# Patient Record
Sex: MALE | Race: WHITE | NOT HISPANIC OR LATINO | URBAN - METROPOLITAN AREA
[De-identification: names, ages, dates, MRNs, and addresses within clinical notes are randomized per-mention and may not be internally consistent; named-entity substitution may affect disease eponyms.]

---

## 2017-04-05 NOTE — EMERGENCY DEPARTMENT REPORT
<NEYDA LONG - Last Filed: 04/05/17 18:43>





ED Abdominal Pain HPI





- General


Chief Complaint: Abdominal Pain


Stated Complaint: RT SIDE PAIN


Time Seen by Provider: 04/05/17 10:47


Source: patient


Mode of arrival: Ambulatory


Limitations: No Limitations





- History of Present Illness


Initial Comments: 





Patient complaining of 2 weeks intermittent right lower quadrant pain patient 

denies nausea vomiting or diarrhea associated with this pain, also denies fever 

or chills, or testicular pain.  Patient noted have a bag of food with him in 

the exam room.


MD Complaint: abdominal pain


-: week(s)


Location: RLQ


Migration to: no migration


Severity: moderate


Severity scale (0 -10): 5


Quality: aching


Consistency: intermittent, colicky


Worsens With: movement


Associated Symptoms: denies: nausea, vomiting, diarrhea, fever, chills, 

constipation, dysuria





- Related Data


 Previous Rx's











 Medication  Instructions  Recorded  Last Taken  Type


 


Ciprofloxacin [Ciprofloxacin ORAL 500 mg PO Q12H #28 ml 04/05/17 Unknown Rx





LIQ]    


 


metroNIDAZOLE [Flagyl] 500 mg PO Q12HR #28 tab 04/05/17 Unknown Rx











 Allergies











Allergy/AdvReac Type Severity Reaction Status Date / Time


 


morphine Allergy  Vomiting Verified 04/05/17 01:24


 


sulfamethoxazole Allergy  Rash Verified 04/05/17 01:24





[From Bactrim]     


 


trimethoprim [From Bactrim] Allergy  Rash Verified 04/05/17 01:24














ED Review of Systems


ROS: 


Stated complaint: RT SIDE PAIN


Other details as noted in HPI





Constitutional: denies: chills, fever, malaise


Eyes: as per HPI


Respiratory: denies: cough, orthopnea, shortness of breath, SOB with exertion, 

SOB at rest, wheezing


Cardiovascular: denies: chest pain, palpitations, dyspnea on exertion, orthopnea

, edema, syncope


Gastrointestinal: abdominal pain.  denies: nausea, vomiting, diarrhea, 

constipation


Genitourinary: denies: urgency, dysuria, frequency, hematuria, discharge, 

testicular pain, testicular mass


Musculoskeletal: denies: back pain


Skin: denies: rash, lesions


Neurological: denies: headache, paresthesias, vertigo





ED Past Medical Hx





- Past Medical History


Previous Medical History?: Yes


Hx Hypertension: Yes


Additional medical history: diverticulitis.  high cholestrol.  Vit D decfinacy





- Surgical History


Past Surgical History?: No





- Medications


Home Medications: 


 Home Medications











 Medication  Instructions  Recorded  Confirmed  Last Taken  Type


 


Ciprofloxacin [Ciprofloxacin ORAL 500 mg PO Q12H #28 ml 04/05/17  Unknown Rx





LIQ]     


 


metroNIDAZOLE [Flagyl] 500 mg PO Q12HR #28 tab 04/05/17  Unknown Rx














ED Physical Exam





- General


Limitations: No Limitations


General appearance: alert, in no apparent distress





- Head


Head exam: Present: atraumatic, normocephalic





- Eye


Eye exam: Present: normal appearance, PERRL, EOMI.  Absent: scleral icterus





- ENT


ENT exam: Present: normal exam, mucous membranes moist





- Neck


Neck exam: Present: normal inspection, full ROM.  Absent: tenderness, 

meningismus, lymphadenopathy





- Respiratory


Respiratory exam: Present: normal lung sounds bilaterally.  Absent: respiratory 

distress, wheezes





- Cardiovascular


Cardiovascular Exam: Present: regular rate





- GI/Abdominal


GI/Abdominal exam: Present: soft, tenderness (mild-to-moderate right lower 

quadrant tenderness on palpation).  Absent: distended, guarding, rebound





- Extremities Exam


Extremities exam: Present: normal inspection.  Absent: normal capillary refill





- Back Exam


Back exam: Present: normal inspection.  Absent: CVA tenderness (R), CVA 

tenderness (L)





- Neurological Exam


Neurological exam: Present: alert, oriented X3, CN II-XII intact





- Skin


Skin exam: Present: warm, dry, intact, normal color.  Absent: rash





ED Course





 Vital Signs











  04/05/17 04/05/17 04/05/17





  01:19 05:53 10:45


 


Temperature 98.4 F 98.1 F 98.5 F


 


Pulse Rate 92 H 76 79


 


Respiratory 20 16 20





Rate   


 


Blood Pressure 122/88 121/78 


 


Blood Pressure   131/82





[Right]   


 


O2 Sat by Pulse 97 97 98





Oximetry   














- Reevaluation(s)


Reevaluation #1: 





04/05/17 13:19


 still resting comfortably in his room.  Patient still has right lower quadrant 

pain on palpation.  Still normotensive normal cardiac and afebrile


04/05/17 13:20


Discussed CT and laboratory findings patient, will treat patient for 

diverticulitis which he says he has recurrent history of was just recently 

treated for.


04/05/17 18:44








ED Medical Decision Making





- Lab Data


Result diagrams: 


 04/05/17 01:38





 04/05/17 01:38








 Lab Results











  04/05/17 04/05/17 04/05/17 Range/Units





  01:38 01:38 Unknown 


 


WBC  8.2    (4.5-11.0)  K/mm3


 


RBC  5.19 H    (3.65-5.03)  M/mm3


 


Hgb  16.4 H    (11.8-15.2)  gm/dl


 


Hct  48.8 H    (35.5-45.6)  %


 


MCV  94    (84-94)  fl


 


MCH  32    (28-32)  pg


 


MCHC  34    (32-34)  %


 


RDW  13.9    (13.2-15.2)  %


 


Plt Count  182    (140-440)  K/mm3


 


Lymph % (Auto)  35.2 H    (13.4-35.0)  %


 


Mono % (Auto)  7.9 H    (0.0-7.3)  %


 


Eos % (Auto)  1.9    (0.0-4.3)  %


 


Baso % (Auto)  0.8    (0.0-1.8)  %


 


Lymph #  2.9    (1.2-5.4)  K/mm3


 


Mono #  0.6    (0.0-0.8)  K/mm3


 


Eos #  0.2    (0.0-0.4)  K/mm3


 


Baso #  0.1    (0.0-0.1)  K/mm3


 


Seg Neutrophils %  54.2    (40.0-70.0)  %


 


Seg Neutrophils #  4.4    (1.8-7.7)  K/mm3


 


Sodium   143   (137-145)  mmol/L


 


Potassium   3.6   (3.6-5.0)  mmol/L


 


Chloride   105.7   ()  mmol/L


 


Carbon Dioxide   22   (22-30)  mmol/L


 


Anion Gap   19   mmol/L


 


BUN   10   (9-20)  mg/dL


 


Creatinine   0.7 L   (0.8-1.5)  mg/dL


 


Estimated GFR   > 60   ml/min


 


BUN/Creatinine Ratio   14.28   %


 


Glucose   99   ()  mg/dL


 


Calcium   9.1   (8.4-10.2)  mg/dL


 


Total Bilirubin   0.4   (0.1-1.2)  mg/dL


 


AST   17   (5-40)  units/L


 


ALT   15   (7-56)  units/L


 


Alkaline Phosphatase   70   ()  units/L


 


Total Protein   6.7   (6.3-8.2)  g/dL


 


Albumin   4.1   (3.9-5)  g/dL


 


Albumin/Globulin Ratio   1.6   %


 


Lipase   21   (13-60)  units/L


 


Urine Color    Yellow  (Yellow)  


 


Urine Turbidity    Clear  (Clear)  


 


Urine pH    5.0  (5.0-7.0)  


 


Ur Specific Gravity    1.023  (1.003-1.030)  


 


Urine Protein    <15 mg/dl  (Negative)  mg/dL


 


Urine Glucose (UA)    Neg  (Negative)  mg/dL


 


Urine Ketones    Tr  (Negative)  mg/dL


 


Urine Blood    Sm  (Negative)  


 


Urine Nitrite    Neg  (Negative)  


 


Urine Bilirubin    Neg  (Negative)  


 


Urine Urobilinogen    < 2.0  (<2.0)  mg/dL


 


Ur Leukocyte Esterase    Sm  (Negative)  


 


Urine WBC (Auto)    1.0  (0.0-6.0)  /HPF


 


Urine RBC (Auto)    7.0  (0.0-6.0)  /HPF


 


U Epithel Cells (Auto)    < 1.0  (0-13.0)  /HPF


 


Urine Bacteria (Auto)    1+  (Negative)  /HPF


 


Urine Mucus    3+  /HPF











Critical care attestation.: 


If time is entered above; I have spent that time in minutes in the direct care 

of this critically ill patient, excluding procedure time.








ED Disposition


Disposition: DISCHARGED TO HOME OR SELFCARE


Is pt being admited?: No


Condition: Stable


Instructions:  Diverticulitis (ED)


Prescriptions: 


Ciprofloxacin [Ciprofloxacin ORAL LIQ] 500 mg PO Q12H #28 ml


metroNIDAZOLE [Flagyl] 500 mg PO Q12HR #28 tab


Referrals: 


VIDYA MARTE MD [Primary Care Provider] - 3-5 Days





<KENDRA BARRIOS - Last Filed: 04/07/17 02:04>





ED Medical Decision Making





- Lab Data


Result diagrams: 


 04/05/17 01:38





 04/05/17 01:38





- Radiology Data


Radiology results: report reviewed (ct a/p: reviewed)

## 2017-04-05 NOTE — CAT SCAN REPORT
CT abdomen and pelvis with contrast:



Transverse images are obtained from lower chest to the ischium. Coronal 

and sagittal 2-D reformatted images are included.



The visualized lung bases are unremarkable. There is a 3 cm splenic 

cyst. There is a 3.7 cm sharply defined low attenuation mass in his 

knee 18 mm mass in the medial right kidney that appears the same. in 

the upper pole of the left kidney. There is a 4.35 cm similar mass in 

the mid to lower kidney. There is an 8 mm nonobstructing calculus in 

the upper pole of the right kidney. The abdominal and retroperitoneal 

organs otherwise appear unremarkable. The abdominal aorta is 

unremarkable. There is no adenopathy noted.



The bowel is unopacified. The appendix is visualized and appears 

normal. There are numerous diverticula in the sigmoid colon. There are 

inflammatory changes in the mesentery adjacent to the low sigmoid colon 

with linear extensions to the colon and an adjacent loop of small 

bowel. No fluid collections identified. No free air and no free fluid 

noted. The prostate gland appears slightly prominent in size with 

degenerative calcifications.



Degenerative spondylosis is present through the lower thoracic and 

upper lumbar spine. Degenerative gas is identified at the L4-5 

interspace and there is narrowing at L1-2 and L5-S1 interspaces.



Impressions:



1. Sigmoid diverticulitis. Doubt perforation.



2. Bilateral renal cysts and nonobstructing right renal calculus. 

Described in 2012.



3. Splenic cyst described previously in 2012.



4. Degenerative lumbar spine changes.

## 2018-04-18 NOTE — EMERGENCY DEPARTMENT REPORT
ED Abdominal Pain HPI





- General


Chief Complaint: Abdominal Pain


Stated Complaint: ABD PAIN


Time Seen by Provider: 04/18/18 08:10


Source: patient


Mode of arrival: Ambulatory


Limitations: No Limitations





- History of Present Illness


Initial Comments: 





57-year-old male with a past medical history of hypertension, diverticulitis, 

and no previous abdominal surgeries presents complaining of right lower 

quadrant pain 1 day.  It is constant, sharp, moderate to severe in intensity.  

Worse with palpation.  No alleviating factors.  Denies nausea, vomiting, fever, 

diarrhea, melena, hematochezia.  Similar symptoms in the past with diagnosed 

diverticulitis last year but this pain episode is worse.


Severity scale (0 -10): 8





- Related Data


 Previous Rx's











 Medication  Instructions  Recorded  Last Taken  Type


 


Ciprofloxacin [Ciprofloxacin ORAL 500 mg PO Q12H #28 ml 04/05/17 Unknown Rx





LIQ]    


 


metroNIDAZOLE [Flagyl] 500 mg PO Q12HR #28 tab 04/05/17 Unknown Rx











 Allergies











Allergy/AdvReac Type Severity Reaction Status Date / Time


 


morphine Allergy  Vomiting Verified 04/05/17 01:24


 


sulfamethoxazole Allergy  Rash Verified 04/05/17 01:24





[From Bactrim]     


 


trimethoprim [From Bactrim] Allergy  Rash Verified 04/05/17 01:24














ED Review of Systems


ROS: 


Stated complaint: ABD PAIN


Other details as noted in HPI





Comment: All other systems reviewed and negative





ED Past Medical Hx





- Past Medical History


Previous Medical History?: Yes


Hx Hypertension: Yes


Additional medical history: diverticulitis.  high cholestrol.  Vit D decfinacy





- Surgical History


Past Surgical History?: No





- Social History


Smoking Status: Current Some Day Smoker


Substance Use Type: None





- Medications


Home Medications: 


 Home Medications











 Medication  Instructions  Recorded  Confirmed  Last Taken  Type


 


Ciprofloxacin [Ciprofloxacin ORAL 500 mg PO Q12H #28 ml 04/05/17  Unknown Rx





LIQ]     


 


metroNIDAZOLE [Flagyl] 500 mg PO Q12HR #28 tab 04/05/17  Unknown Rx














ED Physical Exam





- General


Limitations: No Limitations





- Other


Other exam information: 





General: No limitations, patient is alert in no acute distress


Head exam: Atraumatic, normocephalic


Eyes exam: Normal appearance, nonicteric sclerae


ENT: Moist mucous membrane, normal oropharynx


Neck exam: Normal inspection, full range of motion, no meningismus nontender


Respiratory exam: Clear to auscultation bilateral, no wheezes, rales, crackles


Cardiovascular: Normal rate and rhythm, normal heart sounds


Abdomen: Soft, nondistended, right lower quadrant tenderness, with normal bowel 

sounds, no rebound, or guarding


Extremity: Full range of motion normal inspection no deformity


Back: Normal Inspection, full range of motion, no tenderness


Neurologic: Alert, oriented x3, cranial nerves intact, no motor or sensory 

deficit


Psychiatric: normal affect, normal mood


Skin: Warm, dry, intact





ED Course


 Vital Signs











  04/18/18 04/18/18 04/18/18





  00:56 04:30 07:43


 


Temperature 99.5 F  98.9 F


 


Pulse Rate 98 H 93 H 86


 


Respiratory 18 16 18





Rate   


 


Blood Pressure 148/79 124/74 


 


Blood Pressure   113/63





[Left]   


 


O2 Sat by Pulse 98 98 96





Oximetry   














  04/18/18 04/18/18





  07:44 09:27


 


Temperature  


 


Pulse Rate  


 


Respiratory 18 18





Rate  


 


Blood Pressure  


 


Blood Pressure  





[Left]  


 


O2 Sat by Pulse 96 





Oximetry  














- Reevaluation(s)


Reevaluation #1: 





04/18/18 09:57


no improvement in pain with dilaudid 0.5mg, no nausea, additional meds ordered





- Consultations


Consultation #1: 





04/18/18 10:07


Dr Sanches to consult


04/18/18 10:18


case d/w Dr sanches


Recommend to remain nothing by mouth, continue Zosyn, he will evaluate patient








ED Medical Decision Making





- Lab Data


Result diagrams: 


 04/18/18 01:01





 04/18/18 01:04








 Lab Results











  04/18/18 04/18/18 04/18/18 Range/Units





  01:01 01:04 01:55 


 


WBC  13.7 H    (4.5-11.0)  K/mm3


 


RBC  4.85    (3.65-5.03)  M/mm3


 


Hgb  15.8 H    (11.8-15.2)  gm/dl


 


Hct  47.1 H    (35.5-45.6)  %


 


MCV  97 H    (84-94)  fl


 


MCH  33 H    (28-32)  pg


 


MCHC  34    (32-34)  %


 


RDW  14.5    (13.2-15.2)  %


 


Plt Count  171    (140-440)  K/mm3


 


Lymph % (Auto)  6.1 L    (13.4-35.0)  %


 


Mono % (Auto)  4.9    (0.0-7.3)  %


 


Eos % (Auto)  0.0    (0.0-4.3)  %


 


Baso % (Auto)  0.2    (0.0-1.8)  %


 


Lymph #  0.8 L    (1.2-5.4)  K/mm3


 


Mono #  0.7    (0.0-0.8)  K/mm3


 


Eos #  0.0    (0.0-0.4)  K/mm3


 


Baso #  0.0    (0.0-0.1)  K/mm3


 


Seg Neutrophils %  88.8 H    (40.0-70.0)  %


 


Seg Neutrophils #  12.2 H    (1.8-7.7)  K/mm3


 


Sodium   138   (137-145)  mmol/L


 


Potassium   4.7   (3.6-5.0)  mmol/L


 


Chloride   97.5 L   ()  mmol/L


 


Carbon Dioxide   24   (22-30)  mmol/L


 


Anion Gap   21   mmol/L


 


BUN   10   (9-20)  mg/dL


 


Creatinine   0.8   (0.8-1.5)  mg/dL


 


Estimated GFR   > 60   ml/min


 


BUN/Creatinine Ratio   13   %


 


Glucose   104 H   ()  mg/dL


 


Calcium   9.0   (8.4-10.2)  mg/dL


 


Total Bilirubin   0.80   (0.1-1.2)  mg/dL


 


AST   13   (5-40)  units/L


 


ALT   12   (7-56)  units/L


 


Alkaline Phosphatase   83   ()  units/L


 


Total Protein   6.6   (6.3-8.2)  g/dL


 


Albumin   4.6   (3.9-5)  g/dL


 


Albumin/Globulin Ratio   2.3   %


 


Urine Color    Yellow  (Yellow)  


 


Urine Turbidity    Clear  (Clear)  


 


Urine pH    5.0  (5.0-7.0)  


 


Ur Specific Gravity    1.014  (1.003-1.030)  


 


Urine Protein    <15 mg/dl  (Negative)  mg/dL


 


Urine Glucose (UA)    Neg  (Negative)  mg/dL


 


Urine Ketones    20  (Negative)  mg/dL


 


Urine Blood    Sm  (Negative)  


 


Urine Nitrite    Neg  (Negative)  


 


Urine Bilirubin    Neg  (Negative)  


 


Urine Urobilinogen    < 2.0  (<2.0)  mg/dL


 


Ur Leukocyte Esterase    Tr  (Negative)  


 


Urine WBC (Auto)    1.0  (0.0-6.0)  /HPF


 


Urine RBC (Auto)    2.0  (0.0-6.0)  /HPF


 


U Epithel Cells (Auto)    < 1.0  (0-13.0)  /HPF


 


Urine Mucus    Few  /HPF














- Radiology Data


Radiology results: report reviewed





ct a/p IV contrast read by radiologist:





IMPRESSION: 


Acute sigmoid diverticulitis. Trace free peritoneal air is identified 


but no evidence for abscess at this time. 


Bilateral renal cysts. 


Right renal calculus, nonobstructing. 





- Medical Decision Making





Lower quadrant pain secondary to acute sigmoid diverticulitis with trace free 

air.  Patient given Zosyn, dilaudid, zofran in the ED.  Surgery consulted. Plan 

to admit the hospital.





- Differential Diagnosis


appendicitis, diverticulitis, UTI, renal colic


Critical Care Time: No


Critical care attestation.: 


If time is entered above; I have spent that time in minutes in the direct care 

of this critically ill patient, excluding procedure time.








ED Disposition


Clinical Impression: 


 Acute diverticulitis, Free intraperitoneal air





Disposition: DC-[id number] OP ADMIT IP TO THIS HOSP


Is pt being admited?: Yes


Condition: Stable


Time of Disposition: 09:57 (hospitalist/Dr Araya/Dr Anderson)

## 2018-04-18 NOTE — CAT SCAN REPORT
CT ABDOMEN PELVIS WITH CONTRAST:



HISTORY:  Right lower quadrant pain, history of diverticulitis.



COMPARISON: 4/5/17.



TECHNIQUE:  Helical CT in 1.25mm intervals following IV contrast. 

Sagittal and coronal reconstructions. 





FINDINGS:



Lung bases: Normal.



Liver: Normal.



Biliary system: Normal.



Pancreas: Normal.



Spleen: Normal size spleen. A 2.5 cm cyst with focal wall calcification 

is noted in the anterior spleen which is unchanged.



Kidneys/ureters/bladder: There are 2 simple cysts in the superior left 

kidney measuring 4.6 cm and 3.4 cm. A 1 cm cyst is noted in the mid 

right kidney. An 8 mm calyceal stone is noted at the inferior pole of 

the right kidney. Ureters and bladder are unremarkable. The prostate 

gland is mildly enlarged measuring 5.9 cm in diameter.



Adrenal glands: Normal.



Aorta: Normal.



Intestines: There are scattered diverticula in the sigmoid colon with 

mild circumferential bowel wall thickening and surrounding fat 

stranding. This is consistent with acute sigmoid diverticulitis. There 

is no evidence for abscess, however, trace to small free air is 

identified in the right lower quadrant mesentery and along the anterior 

abdominal wall.



Appendix: Normal.



Pelvic viscera: Normal.



Ascites: None.



Adenopathy: None.



Musculoskeletal: There is moderate thoracolumbar spondylosis. No 

evidence for fracture or suspicious bony lesion.





IMPRESSION:

Acute sigmoid diverticulitis. Trace free peritoneal air is identified 

but no evidence for abscess at this time.

Bilateral renal cysts.

Right renal calculus, nonobstructing.



These findings were discussed with Dr. Page in the emergency department 

at 0920 hours.

## 2018-04-18 NOTE — HISTORY AND PHYSICAL REPORT
History of Present Illness


Date of examination: 04/18/18


Date of admission: 


04/18/18 10:08





Chief complaint: 


Abdominal pains





History of present illness: 


Patient is a 56 yo man with a history of hypertension, tobacco dependency, 

insomnia, dyslipidemia and recurrent diverticulitis who presents to the ED with 

severe right lower quadrant non radiating pains, achy, throbbing and non-

radiating that started on Monday as intermittent pains, now constant and severe 

without aggravating or relieving factors. He denies n/v/fevers/chills/cough/cp/

sob. The abd pains is similar to prior attacks; his last attack was about 1 

year ago. He usually is treated with 2 iv abx for couple of days then 

discharge. He is followed closely by GI, Dr. Sandoval. 





PMH: as hpi, also bph


PSH: tonsillectomy


SH: 1 pack cig/week, no etoh or illegal drug use


FH: sister and brother have hypertension and dm





ROS: 


Constitutional: denies: fever 


ENT: denies: throat or neck pain 


Respiratory: denies: cough, shortness of breath 


Cardiovascular: denies: chest pain 


Endocrine: denies unexplained weight loss or gain 


Gastrointestinal: + abdominal pains, no nausea 


Genitourinary: denies: dysuria 


Rectal: denies no incontinence, no bleeding, no itching, no discharge 


Musculoskeletal: denies swelling, myaglia, muscle weakness 


Skin: denies: rash 


Neurological: denies: headache 


Hematological/Lymphatic: denies: easy bleeding or easy bruising 


Allergic/Immunologic: no urticaria, no allergic rhinitis, no anaphylaxis 


Psych: denies sadness or hopelessness, SI/HI





Medications and Allergies


 Allergies











Allergy/AdvReac Type Severity Reaction Status Date / Time


 


morphine Allergy  Vomiting Verified 04/05/17 01:24


 


sulfamethoxazole Allergy  Rash Verified 04/05/17 01:24





[From Bactrim]     


 


trimethoprim [From Bactrim] Allergy  Rash Verified 04/05/17 01:24











 Home Medications











 Medication  Instructions  Recorded  Confirmed  Last Taken  Type


 


metroNIDAZOLE [Flagyl] 500 mg PO Q12HR #28 tab 04/05/17 04/18/18 04/17/18 Rx


 


Diclofenac Dr [Voltaren Dr] 75 mg PO BID 04/18/18 04/18/18 04/17/18 History


 


Dicyclomine [Bentyl] 10 mg PO QID 04/18/18 04/18/18 04/17/18 History


 


Ergocalciferol [Vitamin D2] 1 cap PO QWEEK 04/18/18 04/18/18 04/17/18 History


 


Levofloxacin [Levaquin TAB] 500 mg PO QDAY 04/18/18 04/18/18 04/17/18 History


 


Omeprazole 20 mg PO DAILY 04/18/18 04/18/18 04/17/18 History


 


Pravastatin [Pravachol] 20 mg PO QHS 04/18/18 04/18/18 04/17/18 History


 


Tamsulosin [Flomax] 0.4 mg PO QDAY 04/18/18 04/18/18 04/17/18 History


 


Zolpidem [Ambien] 10 mg PO QHS 04/18/18 04/18/18 04/17/18 History


 


amLODIPine [Norvasc] 5 mg PO BID 04/18/18 04/18/18 04/17/18 History











Active Meds: 


Active Medications





Piperacillin Sod/Tazobactam Sod (Zosyn/Ns 4.5gm/100ml)  4.5 gm in 100 mls @ 200 

mls/hr IV ONCE RYLEY


   Last Infusion: 04/18/18 11:24 Dose:  Infused











Exam





- Physical Exam


Narrative exam: 


GEN: WDWN, NAD, Awake, Alert, Orientated 


HEENT: NCAT, EOMI, PERRL, OP Clear 


NECK: supple, no adenopathy, no thyromegaly, no JVD 


CVS/HEART: RRR, normal S1S2, pulses present bilaterally 


CHEST/LUNGS: CTA B, Symmetrical chest expansion, good air entry bilaterally 


GI/Abdomen: soft, NTND, good bowel sounds, no guarding or rebound 


/Bladder: no suprapubic tenderness, no CVA or paraspinal tenderness 


EXT/Skin: no c/c/e, no obvious rash 


MSK: FROM x 4 


Neuro: CN 2-12 grossly intact, no new focal deficits 


Psych: calm 








- Constitutional


Vitals: 


 











Temp Pulse Resp BP Pulse Ox


 


 98.9 F   91 H  20   133/55   95 


 


 04/18/18 07:43  04/18/18 11:00  04/18/18 11:50  04/18/18 11:00  04/18/18 11:00














Results





- Labs


CBC & Chem 7: 


 04/18/18 01:01





 04/18/18 01:04


Labs: 


 Abnormal lab results











  04/18/18 04/18/18 Range/Units





  01:01 01:04 


 


WBC  13.7 H   (4.5-11.0)  K/mm3


 


Hgb  15.8 H   (11.8-15.2)  gm/dl


 


Hct  47.1 H   (35.5-45.6)  %


 


MCV  97 H   (84-94)  fl


 


MCH  33 H   (28-32)  pg


 


Lymph % (Auto)  6.1 L   (13.4-35.0)  %


 


Lymph #  0.8 L   (1.2-5.4)  K/mm3


 


Seg Neutrophils %  88.8 H   (40.0-70.0)  %


 


Seg Neutrophils #  12.2 H   (1.8-7.7)  K/mm3


 


Chloride   97.5 L  ()  mmol/L


 


Glucose   104 H  ()  mg/dL














Assessment and Plan


Patient is a 56 yo man with a history of hypertension, tobacco dependency, 

insomnia, dyslipidemia and recurrent diverticulitis who presents to the ED with 

severe right lower quadrant non radiating pains, achy, throbbing and non-

radiating that started on Monday as intermittent pains, now constant and severe 

without aggravating or relieving factors. He denies n/v/fevers/chills/cough/cp/

sob. The abd pains is similar to prior attacks; his last attack was about 1 

year ago. He usually is treated with 2 iv abx for couple of days then 

discharge. He is followed closely by GI, Dr. Sandoval. 





CT abd/pelvis with iv contrast IMPRESSION: Acute sigmoid diverticulitis. Trace 

free peritoneal air is identified but no evidence for abscess at this time. 

Bilateral renal cysts. Right renal calculus, nonobstructing. 





-Sepsis, poa as evident by heart rate 98, wbc 13.7 with diverticulitits: treat 

with ivf, iv abx


-Acute Diverticulitis with possible microperfortation: bowel rest, iv abx, 

General surgery already consulted by ED physician


-Hypertension: hold oral antihypertensive, use iv labetalolo prn


-Tobacco dependency:  on stopping


-Insomnia: hold oral ambien, use prn iv ativan


Dvt/gi ppx reviewed

## 2018-04-18 NOTE — CONSULTATION
REASON FOR CONSULTATION:  Abdominal pain, rule out diverticulitis with possible

microperforation.



HISTORY OF PRESENT ILLNESS:  The patient is a 57-year-old gentleman who was

admitted today with approximately a 2-day history of right lower quadrant

abdominal pain.  Denies any nausea or vomiting.  The patient states he has had a

past history of multiple bouts of diverticulitis in the past, but adamantly

refuses surgery at this time.  He does; however, state he is 

than when I was admitted.



PAST MEDICAL HISTORY:  Pertinent for hypertension, high cholesterol.



PAST SURGICAL HISTORY:  Status post T and A.



ALLERGIES:  Allergic to BACTRIM AND MORPHINE.



MEDICATIONS:  Include his blood pressure medicines 

his

high cholesterol.



FAMILY HISTORY:  Heart disease.



SOCIAL HISTORY:  Denies any ethanol intake.  Smokes what he states is a pack per

week for approximately 45 years.  He states he 

the past.



PHYSICAL EXAMINATION:

GENERAL:  At this time reveals the patient to be awake, alert, cooperative,

resting comfortably in bed with no evidence of acute distress.

VITAL SIGNS:  Show him to be afebrile with a very low grade temperature of 99,

blood pressure of 106/63, pulse of 86, respirations of 22.

ABDOMEN:  Examination of the abdomen reveals to be moderately obese.  The

abdomen is soft in the left lower quadrant, left upper quadrant and right upper

quadrant areas.  The right lower quadrant; however, does exhibit a localized

tenderness with guarding.  Bowel sounds are hypoactive.



LABORATORY DATA:  At present includes a CBC which shows a white count of 13.7, H

and H is 15.8 and 47.1.  Electrolytes are essentially within normal limits as

are the LFTs.



IMAGING DATA:  A CT scan of the abdomen has been performed, which I have

reviewed with Dr. Hernandez, the radiologist.  There is evidence of

microperforation and a very redundant sigmoid colon, which loops over to the

right lower quadrant.  There are also very small pockets of minute gas bubbles

in a couple of areas in the abdomen.  However, there is no evidence of any free

fluid or abscess formation.



IMPRESSION:

1.  At this time is that of a 57-year-old gentleman with a history of multiple

diverticulitis attacks in the past.

2.  Active and current diverticulitis with evidence of microperforation on CT. 

The patient at this time is adamantly refusing any surgery.



RECOMMENDATIONS:  To keep the patient strict n.p.o.  IV fluid hydration.  IV

Levaquin and Flagyl as you are doing.  I will repeat CBC in the morning and

monitor clinically closely with you.





DD: 04/18/2018 15:04

DT: 04/18/2018 21:44

JOB# [id number]  [id number]

CRYSTAL/NICANOR

## 2018-04-18 NOTE — PROGRESS NOTE
Assessment and Plan





Full consult dictated





Chief complaint: 


RLQ abd paiin





History of present illness: 


Patient is a 58 yo man with a history of hypertension, tobacco dependency, 

insomnia, dyslipidemia and recurrent diverticulitis who presents to the ED with 

severe right lower quadrant non radiating pains, achy, throbbing and non-

radiating that started on Monday as intermittent pains, now constant and severe 

without aggravating or relieving factors. He denies n/v/fevers/chills/cough/cp/

sob. The abd pains is similar to prior attacks; his last attack was about 1 

year ago. He usually is treated with 2 iv abx for couple of days then 

discharge. He is followed closely by GI, Dr. Sandoval. 





Pt "feeling better"   hx of "multiple attacks of diverticulitis in the past"    

refuses surg








Abd -  localized RLQ tenderness with guarding.   rest of abd soft,  non tender





CT -  microperforation of diverticulits is redundant sigmoid colon (flops to RLQ

).   No evidence of free fluid or abscess formation.








imp - recurrent diverticulits with microperforation.





pt refusing surg at this time  "feeling better"





rec 





strict NPO


IVF  hydration.





IV Levaquin & Flagyl as you are doing





f/u cbc in am





will follow and reassess clinically in am





 Selected Entries











  04/18/18





  11:57


 


Temperature 99.0 F


 


Pulse Rate 86


 


Respiratory 22





Rate 


 


Blood Pressure 106/63








 Laboratory Tests











  04/18/18 04/18/18





  01:01 01:04


 


WBC  13.7 H 


 


Hgb  15.8 H 


 


Hct  47.1 H 


 


Sodium   138


 


Potassium   4.7


 


Chloride   97.5 L


 


Carbon Dioxide   24


 


BUN   10


 


Creatinine   0.8























Objective


 Vital Signs - 12hr











  04/18/18 04/18/18 04/18/18





  04:30 07:32 07:43


 


Temperature   98.9 F


 


Pulse Rate 93 H  86


 


Respiratory 16  18





Rate   


 


Blood Pressure 124/74  


 


Blood Pressure   113/63





[Left]   


 


O2 Sat by Pulse 98 96 96





Oximetry   














  04/18/18 04/18/18 04/18/18





  07:44 07:46 08:00


 


Temperature   


 


Pulse Rate  88 85


 


Respiratory 18 18 29 H





Rate   


 


Blood Pressure  113/63 122/65


 


Blood Pressure   





[Left]   


 


O2 Sat by Pulse 96 94 





Oximetry   














  04/18/18 04/18/18 04/18/18





  08:16 08:30 09:06


 


Temperature   


 


Pulse Rate 86 85 87


 


Respiratory 14 22 18





Rate   


 


Blood Pressure 122/65 122/65 122/65


 


Blood Pressure   





[Left]   


 


O2 Sat by Pulse 98 96 94





Oximetry   














  04/18/18 04/18/18 04/18/18





  09:16 09:27 09:30


 


Temperature   


 


Pulse Rate 86  88


 


Respiratory 24 18 21





Rate   


 


Blood Pressure 122/65  122/65


 


Blood Pressure   





[Left]   


 


O2 Sat by Pulse 97  94





Oximetry   














  04/18/18 04/18/18 04/18/18





  09:46 10:00 10:16


 


Temperature   


 


Pulse Rate 88 91 H 91 H


 


Respiratory 27 H 11 L 15





Rate   


 


Blood Pressure 122/65 133/55 133/55


 


Blood Pressure   





[Left]   


 


O2 Sat by Pulse 88 96 93





Oximetry   














  04/18/18 04/18/18 04/18/18





  10:30 10:46 11:00


 


Temperature   


 


Pulse Rate 89 92 H 91 H


 


Respiratory 17 20 16





Rate   


 


Blood Pressure 133/55 133/55 133/55


 


Blood Pressure   





[Left]   


 


O2 Sat by Pulse 94 92 95





Oximetry   














  04/18/18 04/18/18 04/18/18





  11:10 11:50 11:57


 


Temperature   99.0 F


 


Pulse Rate 100 H  86


 


Respiratory 23 20 22





Rate   


 


Blood Pressure 133/55  106/63


 


Blood Pressure   





[Left]   


 


O2 Sat by Pulse 94  93





Oximetry   














- Labs





 04/18/18 01:01





 04/18/18 01:04


 Diabetes panel











  04/18/18 Range/Units





  01:04 


 


Sodium  138  (137-145)  mmol/L


 


Potassium  4.7  (3.6-5.0)  mmol/L


 


Chloride  97.5 L  ()  mmol/L


 


Carbon Dioxide  24  (22-30)  mmol/L


 


BUN  10  (9-20)  mg/dL


 


Creatinine  0.8  (0.8-1.5)  mg/dL


 


Glucose  104 H  ()  mg/dL


 


Calcium  9.0  (8.4-10.2)  mg/dL


 


AST  13  (5-40)  units/L


 


ALT  12  (7-56)  units/L


 


Alkaline Phosphatase  83  ()  units/L


 


Total Protein  6.6  (6.3-8.2)  g/dL


 


Albumin  4.6  (3.9-5)  g/dL








 Calcium panel











  04/18/18 Range/Units





  01:04 


 


Calcium  9.0  (8.4-10.2)  mg/dL


 


Albumin  4.6  (3.9-5)  g/dL








 Pituitary panel











  04/18/18 Range/Units





  01:04 


 


Sodium  138  (137-145)  mmol/L


 


Potassium  4.7  (3.6-5.0)  mmol/L


 


Chloride  97.5 L  ()  mmol/L


 


Carbon Dioxide  24  (22-30)  mmol/L


 


BUN  10  (9-20)  mg/dL


 


Creatinine  0.8  (0.8-1.5)  mg/dL


 


Glucose  104 H  ()  mg/dL


 


Calcium  9.0  (8.4-10.2)  mg/dL








 Adrenal panel











  04/18/18 Range/Units





  01:04 


 


Sodium  138  (137-145)  mmol/L


 


Potassium  4.7  (3.6-5.0)  mmol/L


 


Chloride  97.5 L  ()  mmol/L


 


Carbon Dioxide  24  (22-30)  mmol/L


 


BUN  10  (9-20)  mg/dL


 


Creatinine  0.8  (0.8-1.5)  mg/dL


 


Glucose  104 H  ()  mg/dL


 


Calcium  9.0  (8.4-10.2)  mg/dL


 


Total Bilirubin  0.80  (0.1-1.2)  mg/dL


 


AST  13  (5-40)  units/L


 


ALT  12  (7-56)  units/L


 


Alkaline Phosphatase  83  ()  units/L


 


Total Protein  6.6  (6.3-8.2)  g/dL


 


Albumin  4.6  (3.9-5)  g/dL

## 2018-04-19 NOTE — PROGRESS NOTE
Assessment and Plan





Pt afebrile.  "feeling better"





Abd soft.   still RLQ abd tenderness.   slightly improved but still present.  

wbc down to 9.9





stable





keep npo





continue present care











 Selected Entries











  04/19/18





  07:26


 


Temperature 97.8 F


 


Pulse Rate 75


 


Respiratory 18





Rate 


 


Blood Pressure 111/62








 Laboratory Tests











  04/18/18 04/19/18





  01:01 05:30


 


WBC  13.7 H  9.9


 


Hgb  15.8 H  13.8


 


Hct  47.1 H  40.4  D














Objective


 Vital Signs - 12hr











  04/18/18 04/19/18 04/19/18





  23:20 01:23 07:26


 


Temperature 99.4 F  97.8 F


 


Pulse Rate 80  75


 


Respiratory 18 18 18





Rate   


 


Blood Pressure 104/61  111/62


 


O2 Sat by Pulse 91  92





Oximetry   














- Labs





 04/19/18 05:30





 04/19/18 05:30


 Diabetes panel











  04/19/18 Range/Units





  05:30 


 


Sodium  140  (137-145)  mmol/L


 


Potassium  3.7  D  (3.6-5.0)  mmol/L


 


Chloride  99.9  ()  mmol/L


 


Carbon Dioxide  28  (22-30)  mmol/L


 


BUN  11  (9-20)  mg/dL


 


Creatinine  0.8  (0.8-1.5)  mg/dL


 


Glucose  96  ()  mg/dL


 


Calcium  8.7  (8.4-10.2)  mg/dL








 Calcium panel











  04/19/18 Range/Units





  05:30 


 


Calcium  8.7  (8.4-10.2)  mg/dL








 Pituitary panel











  04/19/18 Range/Units





  05:30 


 


Sodium  140  (137-145)  mmol/L


 


Potassium  3.7  D  (3.6-5.0)  mmol/L


 


Chloride  99.9  ()  mmol/L


 


Carbon Dioxide  28  (22-30)  mmol/L


 


BUN  11  (9-20)  mg/dL


 


Creatinine  0.8  (0.8-1.5)  mg/dL


 


Glucose  96  ()  mg/dL


 


Calcium  8.7  (8.4-10.2)  mg/dL








 Adrenal panel











  04/19/18 Range/Units





  05:30 


 


Sodium  140  (137-145)  mmol/L


 


Potassium  3.7  D  (3.6-5.0)  mmol/L


 


Chloride  99.9  ()  mmol/L


 


Carbon Dioxide  28  (22-30)  mmol/L


 


BUN  11  (9-20)  mg/dL


 


Creatinine  0.8  (0.8-1.5)  mg/dL


 


Glucose  96  ()  mg/dL


 


Calcium  8.7  (8.4-10.2)  mg/dL

## 2018-04-19 NOTE — PROGRESS NOTE
Assessment and Plan


Assessment and plan: 


Patient is a 58 yo man with a history of hypertension, tobacco dependency, 

insomnia, dyslipidemia and recurrent diverticulitis who presents to the ED with 

severe right lower quadrant non radiating pains, achy, throbbing and non-

radiating that started on Monday as intermittent pains, now constant and severe 

without aggravating or relieving factors. He denies n/v/fevers/chills/cough/cp/

sob. The abd pains is similar to prior attacks; his last attack was about 1 

year ago. He usually is treated with 2 iv abx for couple of days then 

discharge. He is followed closely by GI, Dr. Sandoval. 





CT abd/pelvis with iv contrast IMPRESSION: Acute sigmoid diverticulitis. Trace 

free peritoneal air is identified but no evidence for abscess at this time. 

Bilateral renal cysts. Right renal calculus, nonobstructing. 





-Sepsis, poa as evident by heart rate 98, wbc 13.7 with diverticulitits: treat 

with ivf, iv abx


-Acute Sigmoid Diverticulitis with microperfortation: bowel rest, iv abx, 

General surgery following, pain control with iv narcotics


-Hypertension: hold oral antihypertensive, use iv labetalol prn


-Tobacco dependency:  on stopping


-Insomnia: hold oral ambien, use prn iv ativan


Dvt/gi ppx reviewed





continue npo, ivf, iv abx


labs stable, wbc normal now, no need to re-order tomorrow





History


Interval history: 


Patient was seen and examined. Follow-up on current diagnosis. Overnight 

uneventful. Patient denies any chest pain, shortness breath, nausea/vomiting or 

severe headaches. Imaging, nursing note, chart, labs and old chart reviewed. 

Discussed with patient.  





Hospitalist Physical





- Physical exam


Narrative exam: 


GEN: WDWN, NAD, Awake, Alert, Orientated x 3


HEENT: NCAT, EOMI, PERRL, OP Clear 


NECK: supple, no adenopathy, no thyromegaly, no JVD 


CVS/HEART: RRR, normal S1S2, pulses present bilaterally 


CHEST/LUNGS: CTA B, Symmetrical chest expansion, good air entry bilaterally 


GI/Abdomen: soft, RLQ tender no subq air palp, good bowel sounds, no guarding 

or rebound 


/Bladder: no suprapubic tenderness, no CVA or paraspinal tenderness 


EXT/Skin: no c/c/e, no obvious rash 


MSK: FROM x 4 


Neuro: CN 2-12 grossly intact, no new focal deficits 


Psych: calm 








- Constitutional


Vitals: 


 











Temp Pulse Resp BP Pulse Ox


 


 97.8 F   75   18   111/62   92 


 


 04/19/18 07:26  04/19/18 07:26  04/19/18 07:26  04/19/18 07:26  04/19/18 07:26














Results





- Labs


CBC & Chem 7: 


 04/19/18 05:30





 04/19/18 05:30


Labs: 


 Laboratory Last Values











WBC  9.9 K/mm3 (4.5-11.0)   04/19/18  05:30    


 


RBC  4.18 M/mm3 (3.65-5.03)   04/19/18  05:30    


 


Hgb  13.8 gm/dl (11.8-15.2)   04/19/18  05:30    


 


Hct  40.4 % (35.5-45.6)  D 04/19/18  05:30    


 


MCV  97 fl (84-94)  H  04/19/18  05:30    


 


MCH  33 pg (28-32)  H  04/19/18  05:30    


 


MCHC  34 % (32-34)   04/19/18  05:30    


 


RDW  14.4 % (13.2-15.2)   04/19/18  05:30    


 


Plt Count  137 K/mm3 (140-440)  L  04/19/18  05:30    


 


Lymph % (Auto)  16.2 % (13.4-35.0)   04/19/18  05:30    


 


Mono % (Auto)  7.2 % (0.0-7.3)   04/19/18  05:30    


 


Eos % (Auto)  0.7 % (0.0-4.3)   04/19/18  05:30    


 


Baso % (Auto)  0.2 % (0.0-1.8)   04/19/18  05:30    


 


Lymph #  1.6 K/mm3 (1.2-5.4)   04/19/18  05:30    


 


Mono #  0.7 K/mm3 (0.0-0.8)   04/19/18  05:30    


 


Eos #  0.1 K/mm3 (0.0-0.4)   04/19/18  05:30    


 


Baso #  0.0 K/mm3 (0.0-0.1)   04/19/18  05:30    


 


Seg Neutrophils %  75.7 % (40.0-70.0)  H  04/19/18  05:30    


 


Seg Neutrophils #  7.5 K/mm3 (1.8-7.7)   04/19/18  05:30    


 


Sodium  140 mmol/L (137-145)   04/19/18  05:30    


 


Potassium  3.7 mmol/L (3.6-5.0)  D 04/19/18  05:30    


 


Chloride  99.9 mmol/L ()   04/19/18  05:30    


 


Carbon Dioxide  28 mmol/L (22-30)   04/19/18  05:30    


 


Anion Gap  16 mmol/L  04/19/18  05:30    


 


BUN  11 mg/dL (9-20)   04/19/18  05:30    


 


Creatinine  0.8 mg/dL (0.8-1.5)   04/19/18  05:30    


 


Estimated GFR  > 60 ml/min  04/19/18  05:30    


 


BUN/Creatinine Ratio  14 %  04/19/18  05:30    


 


Glucose  96 mg/dL ()   04/19/18  05:30    


 


Calcium  8.7 mg/dL (8.4-10.2)   04/19/18  05:30    


 


Total Bilirubin  0.80 mg/dL (0.1-1.2)   04/18/18  01:04    


 


AST  13 units/L (5-40)   04/18/18  01:04    


 


ALT  12 units/L (7-56)   04/18/18  01:04    


 


Alkaline Phosphatase  83 units/L ()   04/18/18  01:04    


 


Total Protein  6.6 g/dL (6.3-8.2)   04/18/18  01:04    


 


Albumin  4.6 g/dL (3.9-5)   04/18/18  01:04    


 


Albumin/Globulin Ratio  2.3 %  04/18/18  01:04    


 


Urine Color  Yellow  (Yellow)   04/18/18  01:55    


 


Urine Turbidity  Clear  (Clear)   04/18/18  01:55    


 


Urine pH  5.0  (5.0-7.0)   04/18/18  01:55    


 


Ur Specific Gravity  1.014  (1.003-1.030)   04/18/18  01:55    


 


Urine Protein  <15 mg/dl mg/dL (Negative)   04/18/18  01:55    


 


Urine Glucose (UA)  Neg mg/dL (Negative)   04/18/18  01:55    


 


Urine Ketones  20 mg/dL (Negative)   04/18/18  01:55    


 


Urine Blood  Sm  (Negative)   04/18/18  01:55    


 


Urine Nitrite  Neg  (Negative)   04/18/18  01:55    


 


Urine Bilirubin  Neg  (Negative)   04/18/18  01:55    


 


Urine Urobilinogen  < 2.0 mg/dL (<2.0)   04/18/18  01:55    


 


Ur Leukocyte Esterase  Tr  (Negative)   04/18/18  01:55    


 


Urine WBC (Auto)  1.0 /HPF (0.0-6.0)   04/18/18  01:55    


 


Urine RBC (Auto)  2.0 /HPF (0.0-6.0)   04/18/18  01:55    


 


U Epithel Cells (Auto)  < 1.0 /HPF (0-13.0)   04/18/18  01:55    


 


Urine Mucus  Few /HPF  04/18/18  01:55

## 2018-04-20 NOTE — PROGRESS NOTE
Assessment and Plan


Assessment and plan: 





Patient is a 56 yo man with a history of hypertension, tobacco dependency, 

insomnia, dyslipidemia and recurrent diverticulitis who presents to the ED with 

severe right lower quadrant non radiating pains, achy, throbbing and non-

radiating that started on Monday as intermittent pains, now constant and severe 

without aggravating or relieving factors. He denies n/v/fevers/chills/cough/cp/

sob. The abd pains is similar to prior attacks; his last attack was about 1 

year ago. He usually is treated with 2 iv abx for couple of days then 

discharge. He is followed closely by GI, Dr. Sandoval. 





CT abd/pelvis with iv contrast IMPRESSION: Acute sigmoid diverticulitis. Trace 

free peritoneal air is identified but no evidence for abscess at this time. 

Bilateral renal cysts. Right renal calculus, nonobstructing. 





-Sepsis, poa as evident by heart rate 98, wbc 13.7 with diverticulitits: treat 

with ivf, iv abx


-Acute Sigmoid Diverticulitis with microperfortation: bowel rest, iv abx, 

General surgery following, pain control with iv narcotics


-Hypertension: hold oral antihypertensive, use iv labetalol prn


-Tobacco dependency: counseled on stopping


-Insomnia: hold oral ambien, use prn iv ativan


Dvt/gi ppx reviewed





continue npo, ivf, iv abx








may have ice chips and PO meds, nothing else per oral





History


Interval history: 





still c/o lower abdominal pain, up to 8/10


no fever, no nausea, no cp, no sob, 





Hospitalist Physical





- Constitutional


Vitals: 


 











Temp Pulse Resp BP Pulse Ox


 


 98.6 F   67   20   111/64   94 


 


 04/20/18 14:41  04/20/18 14:41  04/20/18 14:41  04/20/18 14:41  04/20/18 14:41











General appearance: Present: no acute distress





- EENT


Eyes: Present: PERRL


ENT: hearing intact





- Neck


Neck: Present: supple





- Respiratory


Respiratory effort: normal


Respiratory: bilateral: CTA





- Cardiovascular


Rhythm: regular


Heart Sounds: Present: S1 & S2





- Extremities


Extremities: no ischemia


Peripheral Pulses: within normal limits





- Abdominal


General gastrointestinal: soft, tender (lower abdomen)





- Integumentary


Integumentary: Present: clear, warm, dry





- Psychiatric


Psychiatric: appropriate mood/affect, intact judgment & insight





- Neurologic


Neurologic: CNII-XII intact, moves all extremities





Results





- Labs


CBC & Chem 7: 


 04/21/18 07:50





 04/21/18 07:50


Labs: 


 Laboratory Last Values











WBC  9.9 K/mm3 (4.5-11.0)   04/19/18  05:30    


 


RBC  4.18 M/mm3 (3.65-5.03)   04/19/18  05:30    


 


Hgb  13.8 gm/dl (11.8-15.2)   04/19/18  05:30    


 


Hct  40.4 % (35.5-45.6)  D 04/19/18  05:30    


 


MCV  97 fl (84-94)  H  04/19/18  05:30    


 


MCH  33 pg (28-32)  H  04/19/18  05:30    


 


MCHC  34 % (32-34)   04/19/18  05:30    


 


RDW  14.4 % (13.2-15.2)   04/19/18  05:30    


 


Plt Count  137 K/mm3 (140-440)  L  04/19/18  05:30    


 


Lymph % (Auto)  16.2 % (13.4-35.0)   04/19/18  05:30    


 


Mono % (Auto)  7.2 % (0.0-7.3)   04/19/18  05:30    


 


Eos % (Auto)  0.7 % (0.0-4.3)   04/19/18  05:30    


 


Baso % (Auto)  0.2 % (0.0-1.8)   04/19/18  05:30    


 


Lymph #  1.6 K/mm3 (1.2-5.4)   04/19/18  05:30    


 


Mono #  0.7 K/mm3 (0.0-0.8)   04/19/18  05:30    


 


Eos #  0.1 K/mm3 (0.0-0.4)   04/19/18  05:30    


 


Baso #  0.0 K/mm3 (0.0-0.1)   04/19/18  05:30    


 


Seg Neutrophils %  75.7 % (40.0-70.0)  H  04/19/18  05:30    


 


Seg Neutrophils #  7.5 K/mm3 (1.8-7.7)   04/19/18  05:30    


 


Sodium  140 mmol/L (137-145)   04/19/18  05:30    


 


Potassium  3.7 mmol/L (3.6-5.0)  D 04/19/18  05:30    


 


Chloride  99.9 mmol/L ()   04/19/18  05:30    


 


Carbon Dioxide  28 mmol/L (22-30)   04/19/18  05:30    


 


Anion Gap  16 mmol/L  04/19/18  05:30    


 


BUN  11 mg/dL (9-20)   04/19/18  05:30    


 


Creatinine  0.8 mg/dL (0.8-1.5)   04/19/18  05:30    


 


Estimated GFR  > 60 ml/min  04/19/18  05:30    


 


BUN/Creatinine Ratio  14 %  04/19/18  05:30    


 


Glucose  96 mg/dL ()   04/19/18  05:30    


 


Calcium  8.7 mg/dL (8.4-10.2)   04/19/18  05:30    


 


Total Bilirubin  0.80 mg/dL (0.1-1.2)   04/18/18  01:04    


 


AST  13 units/L (5-40)   04/18/18  01:04    


 


ALT  12 units/L (7-56)   04/18/18  01:04    


 


Alkaline Phosphatase  83 units/L ()   04/18/18  01:04    


 


Total Protein  6.6 g/dL (6.3-8.2)   04/18/18  01:04    


 


Albumin  4.6 g/dL (3.9-5)   04/18/18  01:04    


 


Albumin/Globulin Ratio  2.3 %  04/18/18  01:04    


 


Urine Color  Yellow  (Yellow)   04/18/18  01:55    


 


Urine Turbidity  Clear  (Clear)   04/18/18  01:55    


 


Urine pH  5.0  (5.0-7.0)   04/18/18  01:55    


 


Ur Specific Gravity  1.014  (1.003-1.030)   04/18/18  01:55    


 


Urine Protein  <15 mg/dl mg/dL (Negative)   04/18/18  01:55    


 


Urine Glucose (UA)  Neg mg/dL (Negative)   04/18/18  01:55    


 


Urine Ketones  20 mg/dL (Negative)   04/18/18  01:55    


 


Urine Blood  Sm  (Negative)   04/18/18  01:55    


 


Urine Nitrite  Neg  (Negative)   04/18/18  01:55    


 


Urine Bilirubin  Neg  (Negative)   04/18/18  01:55    


 


Urine Urobilinogen  < 2.0 mg/dL (<2.0)   04/18/18  01:55    


 


Ur Leukocyte Esterase  Tr  (Negative)   04/18/18  01:55    


 


Urine WBC (Auto)  1.0 /HPF (0.0-6.0)   04/18/18  01:55    


 


Urine RBC (Auto)  2.0 /HPF (0.0-6.0)   04/18/18  01:55    


 


U Epithel Cells (Auto)  < 1.0 /HPF (0-13.0)   04/18/18  01:55    


 


Urine Mucus  Few /HPF  04/18/18  01:55

## 2018-04-20 NOTE — QUERY- PERITONITIS
Deakemal Evans_______________  Date:__4/20/2018____________ 



/CDS:__Delmy____________  Phone#:__[phone]_________



Exercise your independent professional judgment when responding to query. 

Questions asked do not imply a particular answer is desired or expected. We 
greatly appreciate your clarification on this issue.           

Clinical Documentation States:



57 Year old male was admitted on 4/18/2018 with severe right lower quadrant non 
radiating pains, achy, throbbing and non-radiating.



The Hospitalist (Dr. Anderson) progress note on 4/19/2018 states



"CT abd/pelvis with iv contrast IMPRESSION: Acute sigmoid diverticulitis. Trace 
free peritoneal air is identified but no evidence for abscess at this time. 
Bilateral renal cysts. Right renal calculus, nonobstructing. 



-Sepsis, poa as evident by heart rate 98, wbc 13.7 with diverticulitits: treat 
with ivf, iv abx

-Acute Sigmoid Diverticulitis with microperfortation: bowel rest, iv abx, 
General surgery following, pain control with iv narcotics

   



Please clarify:



[ x] Localized peritonitis                 [ ] Peritoneal inflammation



[ ] Generalized peritonitis              [ ] Peritoneal infection



[ ] Retroperitoneal infection            [ ] Peritoneal irritation



[ ] Retroperitoneal inflammation      [ ] Omentitis 



[ ] Other:_______________     



[ ] Comment/Explanation:___________ 





Present on Admission:   [x ] Yes (Y)    [ ] Clinically undeterminable (W)    [ 
] No (N)   



Please also document response in your Progress Notes and/or Discharge Summary 
and indicate if the 

condition was present on admission.
BRAYDEN

## 2018-04-20 NOTE — PROGRESS NOTE
Assessment and Plan





Pt status quo.  feeling "about the same"





Abd exam - status quo








acute recurrent diverticulitis with micro perforation





stable





may have ice chips and po meds otherwise NPO





may consider TPN if pain persists beyond the next 48 hrs.





f/u CT next wk


 Selected Entries











  04/19/18 04/19/18 04/19/18





  19:05 22:00 22:53


 


Temperature 97.2 F L  


 


Pulse Rate [  72 





Left Radial]   


 


Respiratory   20





Rate   


 


Blood Pressure 105/63  














Objective


 Vital Signs - 12hr











  04/19/18 04/19/18





  22:00 22:53


 


Pulse Rate [ 72 





Left Radial]  


 


Respiratory 20 20





Rate  














- Labs





 04/19/18 05:30





 04/19/18 05:30

## 2018-04-21 NOTE — PROGRESS NOTE
Assessment and Plan


Assessment and plan: 





Patient is a 56 yo man with a history of hypertension, tobacco dependency, 

insomnia, dyslipidemia and recurrent diverticulitis who presents to the ED with 

severe right lower quadrant non radiating pains, achy, throbbing and non-

radiating that started on Monday as intermittent pains, now constant and severe 

without aggravating or relieving factors. He denies n/v/fevers/chills/cough/cp/

sob. The abd pains is similar to prior attacks; his last attack was about 1 

year ago. He usually is treated with 2 iv abx for couple of days then 

discharge. He is followed closely by GI, Dr. Sandoval. 





CT abd/pelvis with iv contrast IMPRESSION: Acute sigmoid diverticulitis. Trace 

free peritoneal air is identified but no evidence for abscess at this time. 

Bilateral renal cysts. Right renal calculus, nonobstructing. 





-Sepsis, poa as evident by heart rate 98, wbc 13.7 with diverticulitits: treat 

with ivf, iv abx


-Acute Sigmoid Diverticulitis with microperfortation: bowel rest, iv abx, 

General surgery following, pain control with iv narcotics


-Hypertension: hold oral antihypertensive, use iv labetalol prn


-Tobacco dependency: counseled on stopping


-Insomnia: hold oral ambien, use prn iv ativan


Dvt/gi ppx reviewed





continue npo, ivf, iv abx








may have ice chips and PO meds, nothing else per oral





History


Interval history: 





still c/o lower abdominal pain, up to 6/10, improved


no fever, no nausea, no cp, no sob, 





Hospitalist Physical





- Physical exam


Narrative exam: 





General appearance: Present: no acute distress





- EENT


Eyes: Present: PERRL


ENT: hearing intact





- Neck


Neck: Present: supple





- Respiratory


Respiratory effort: normal


Respiratory: bilateral: CTA





- Cardiovascular


Rhythm: regular


Heart Sounds: Present: S1 & S2





- Extremities


Extremities: no ischemia


Peripheral Pulses: within normal limits





- Abdominal


General gastrointestinal: soft, tender (lower abdomen)





- Integumentary


Integumentary: Present: clear, warm, dry





- Psychiatric


Psychiatric: appropriate mood/affect, intact judgment & insight





- Neurologic


Neurologic: CNII-XII intact, moves all extremities





- Constitutional


Vitals: 


 











Temp Pulse Resp BP Pulse Ox


 


 98.6 F   67   22   111/56   93 


 


 04/21/18 07:32  04/21/18 07:32  04/21/18 12:06  04/21/18 07:32  04/21/18 07:32














Results





- Labs


CBC & Chem 7: 


 04/21/18 07:50





 04/21/18 07:50


Labs: 


 Laboratory Last Values











WBC  4.9 K/mm3 (4.5-11.0)   04/21/18  07:50    


 


RBC  4.31 M/mm3 (3.65-5.03)   04/21/18  07:50    


 


Hgb  14.1 gm/dl (11.8-15.2)   04/21/18  07:50    


 


Hct  41.2 % (35.5-45.6)   04/21/18  07:50    


 


MCV  96 fl (84-94)  H  04/21/18  07:50    


 


MCH  33 pg (28-32)  H  04/21/18  07:50    


 


MCHC  34 % (32-34)   04/21/18  07:50    


 


RDW  14.3 % (13.2-15.2)   04/21/18  07:50    


 


Plt Count  159 K/mm3 (140-440)   04/21/18  07:50    


 


Lymph % (Auto)  16.2 % (13.4-35.0)   04/19/18  05:30    


 


Mono % (Auto)  7.2 % (0.0-7.3)   04/19/18  05:30    


 


Eos % (Auto)  0.7 % (0.0-4.3)   04/19/18  05:30    


 


Baso % (Auto)  0.2 % (0.0-1.8)   04/19/18  05:30    


 


Lymph #  1.6 K/mm3 (1.2-5.4)   04/19/18  05:30    


 


Mono #  0.7 K/mm3 (0.0-0.8)   04/19/18  05:30    


 


Eos #  0.1 K/mm3 (0.0-0.4)   04/19/18  05:30    


 


Baso #  0.0 K/mm3 (0.0-0.1)   04/19/18  05:30    


 


Seg Neutrophils %  75.7 % (40.0-70.0)  H  04/19/18  05:30    


 


Seg Neutrophils #  7.5 K/mm3 (1.8-7.7)   04/19/18  05:30    


 


Sodium  136 mmol/L (137-145)  L  04/21/18  07:50    


 


Potassium  3.3 mmol/L (3.6-5.0)  L  04/21/18  07:50    


 


Chloride  102.7 mmol/L ()   04/21/18  07:50    


 


Carbon Dioxide  25 mmol/L (22-30)   04/21/18  07:50    


 


Anion Gap  12 mmol/L  04/21/18  07:50    


 


BUN  6 mg/dL (9-20)  L  04/21/18  07:50    


 


Creatinine  0.6 mg/dL (0.8-1.5)  L  04/21/18  07:50    


 


Estimated GFR  > 60 ml/min  04/21/18  07:50    


 


BUN/Creatinine Ratio  10 %  04/21/18  07:50    


 


Glucose  116 mg/dL ()  H  04/21/18  07:50    


 


Calcium  8.5 mg/dL (8.4-10.2)   04/21/18  07:50    


 


Total Bilirubin  0.80 mg/dL (0.1-1.2)   04/18/18  01:04    


 


AST  13 units/L (5-40)   04/18/18  01:04    


 


ALT  12 units/L (7-56)   04/18/18  01:04    


 


Alkaline Phosphatase  83 units/L ()   04/18/18  01:04    


 


Total Protein  6.6 g/dL (6.3-8.2)   04/18/18  01:04    


 


Albumin  4.6 g/dL (3.9-5)   04/18/18  01:04    


 


Albumin/Globulin Ratio  2.3 %  04/18/18  01:04    


 


Urine Color  Yellow  (Yellow)   04/18/18  01:55    


 


Urine Turbidity  Clear  (Clear)   04/18/18  01:55    


 


Urine pH  5.0  (5.0-7.0)   04/18/18  01:55    


 


Ur Specific Gravity  1.014  (1.003-1.030)   04/18/18  01:55    


 


Urine Protein  <15 mg/dl mg/dL (Negative)   04/18/18  01:55    


 


Urine Glucose (UA)  Neg mg/dL (Negative)   04/18/18  01:55    


 


Urine Ketones  20 mg/dL (Negative)   04/18/18  01:55    


 


Urine Blood  Sm  (Negative)   04/18/18  01:55    


 


Urine Nitrite  Neg  (Negative)   04/18/18  01:55    


 


Urine Bilirubin  Neg  (Negative)   04/18/18  01:55    


 


Urine Urobilinogen  < 2.0 mg/dL (<2.0)   04/18/18  01:55    


 


Ur Leukocyte Esterase  Tr  (Negative)   04/18/18  01:55    


 


Urine WBC (Auto)  1.0 /HPF (0.0-6.0)   04/18/18  01:55    


 


Urine RBC (Auto)  2.0 /HPF (0.0-6.0)   04/18/18  01:55    


 


U Epithel Cells (Auto)  < 1.0 /HPF (0-13.0)   04/18/18  01:55    


 


Urine Mucus  Few /HPF  04/18/18  01:55

## 2018-04-22 NOTE — PROGRESS NOTE
Assessment and Plan


Assessment and plan: 





Patient is a 58 yo man with a history of hypertension, tobacco dependency, 

insomnia, dyslipidemia and recurrent diverticulitis who presents to the ED with 

severe right lower quadrant non radiating pains, achy, throbbing and non-

radiating that started on Monday as intermittent pains, now constant and severe 

without aggravating or relieving factors. He denies n/v/fevers/chills/cough/cp/

sob. The abd pains is similar to prior attacks; his last attack was about 1 

year ago. He usually is treated with 2 iv abx for couple of days then 

discharge. He is followed closely by GI, Dr. Sandoval. 





CT abd/pelvis with iv contrast IMPRESSION: Acute sigmoid diverticulitis. Trace 

free peritoneal air is identified but no evidence for abscess at this time. 

Bilateral renal cysts. Right renal calculus, nonobstructing. 





-Sepsis, poa as evident by heart rate 98, wbc 13.7 with diverticulitits: treat 

with ivf, iv abx


-Acute Sigmoid Diverticulitis with microperfortation: bowel rest, iv abx, 

General surgery following, pain control with iv narcotics


-Hypertension: hold oral antihypertensive, use iv labetalol prn


-Tobacco dependency: counseled on stopping


-Insomnia: hold oral ambien, use prn iv ativan


-hypokalemia; repleted


Dvt/gi ppx reviewed








will try on a popsicle and awaiting gen surgery to see the patient this weekend


-if pain worsens, will make NPO again with ice chips only





History


Interval history: 





still c/o lower abdominal pain, up to 4/10, improved; localized to RLQ


no fever, no nausea, no cp, no sob, 





Hospitalist Physical





- Physical exam


Narrative exam: 





General appearance: Present: no acute distress





- EENT


Eyes: Present: PERRL


ENT: hearing intact





- Neck


Neck: Present: supple





- Respiratory


Respiratory effort: normal


Respiratory: bilateral: CTA





- Cardiovascular


Rhythm: regular


Heart Sounds: Present: S1 & S2





- Extremities


Extremities: no ischemia


Peripheral Pulses: within normal limits





- Abdominal


General gastrointestinal: soft, tender (lower abdomen)





- Integumentary


Integumentary: Present: clear, warm, dry





- Psychiatric


Psychiatric: appropriate mood/affect, intact judgment & insight





- Neurologic


Neurologic: CNII-XII intact, moves all extremities





- Constitutional


Vitals: 


 











Temp Pulse Resp BP Pulse Ox


 


 98.0 F   59 L  16   125/77   97 


 


 04/22/18 14:56  04/22/18 14:56  04/22/18 14:56  04/22/18 14:56  04/22/18 14:56














Results





- Labs


CBC & Chem 7: 


 04/21/18 07:50





 04/21/18 07:50


Labs: 


 Laboratory Last Values











WBC  4.9 K/mm3 (4.5-11.0)   04/21/18  07:50    


 


RBC  4.31 M/mm3 (3.65-5.03)   04/21/18  07:50    


 


Hgb  14.1 gm/dl (11.8-15.2)   04/21/18  07:50    


 


Hct  41.2 % (35.5-45.6)   04/21/18  07:50    


 


MCV  96 fl (84-94)  H  04/21/18  07:50    


 


MCH  33 pg (28-32)  H  04/21/18  07:50    


 


MCHC  34 % (32-34)   04/21/18  07:50    


 


RDW  14.3 % (13.2-15.2)   04/21/18  07:50    


 


Plt Count  159 K/mm3 (140-440)   04/21/18  07:50    


 


Lymph % (Auto)  16.2 % (13.4-35.0)   04/19/18  05:30    


 


Mono % (Auto)  7.2 % (0.0-7.3)   04/19/18  05:30    


 


Eos % (Auto)  0.7 % (0.0-4.3)   04/19/18  05:30    


 


Baso % (Auto)  0.2 % (0.0-1.8)   04/19/18  05:30    


 


Lymph #  1.6 K/mm3 (1.2-5.4)   04/19/18  05:30    


 


Mono #  0.7 K/mm3 (0.0-0.8)   04/19/18  05:30    


 


Eos #  0.1 K/mm3 (0.0-0.4)   04/19/18  05:30    


 


Baso #  0.0 K/mm3 (0.0-0.1)   04/19/18  05:30    


 


Seg Neutrophils %  75.7 % (40.0-70.0)  H  04/19/18  05:30    


 


Seg Neutrophils #  7.5 K/mm3 (1.8-7.7)   04/19/18  05:30    


 


Sodium  136 mmol/L (137-145)  L  04/21/18  07:50    


 


Potassium  3.3 mmol/L (3.6-5.0)  L  04/21/18  07:50    


 


Chloride  102.7 mmol/L ()   04/21/18  07:50    


 


Carbon Dioxide  25 mmol/L (22-30)   04/21/18  07:50    


 


Anion Gap  12 mmol/L  04/21/18  07:50    


 


BUN  6 mg/dL (9-20)  L  04/21/18  07:50    


 


Creatinine  0.6 mg/dL (0.8-1.5)  L  04/21/18  07:50    


 


Estimated GFR  > 60 ml/min  04/21/18  07:50    


 


BUN/Creatinine Ratio  10 %  04/21/18  07:50    


 


Glucose  116 mg/dL ()  H  04/21/18  07:50    


 


Calcium  8.5 mg/dL (8.4-10.2)   04/21/18  07:50    


 


Total Bilirubin  0.80 mg/dL (0.1-1.2)   04/18/18  01:04    


 


AST  13 units/L (5-40)   04/18/18  01:04    


 


ALT  12 units/L (7-56)   04/18/18  01:04    


 


Alkaline Phosphatase  83 units/L ()   04/18/18  01:04    


 


Total Protein  6.6 g/dL (6.3-8.2)   04/18/18  01:04    


 


Albumin  4.6 g/dL (3.9-5)   04/18/18  01:04    


 


Albumin/Globulin Ratio  2.3 %  04/18/18  01:04    


 


Urine Color  Yellow  (Yellow)   04/18/18  01:55    


 


Urine Turbidity  Clear  (Clear)   04/18/18  01:55    


 


Urine pH  5.0  (5.0-7.0)   04/18/18  01:55    


 


Ur Specific Gravity  1.014  (1.003-1.030)   04/18/18  01:55    


 


Urine Protein  <15 mg/dl mg/dL (Negative)   04/18/18  01:55    


 


Urine Glucose (UA)  Neg mg/dL (Negative)   04/18/18  01:55    


 


Urine Ketones  20 mg/dL (Negative)   04/18/18  01:55    


 


Urine Blood  Sm  (Negative)   04/18/18  01:55    


 


Urine Nitrite  Neg  (Negative)   04/18/18  01:55    


 


Urine Bilirubin  Neg  (Negative)   04/18/18  01:55    


 


Urine Urobilinogen  < 2.0 mg/dL (<2.0)   04/18/18  01:55    


 


Ur Leukocyte Esterase  Tr  (Negative)   04/18/18  01:55    


 


Urine WBC (Auto)  1.0 /HPF (0.0-6.0)   04/18/18  01:55    


 


Urine RBC (Auto)  2.0 /HPF (0.0-6.0)   04/18/18  01:55    


 


U Epithel Cells (Auto)  < 1.0 /HPF (0-13.0)   04/18/18  01:55    


 


Urine Mucus  Few /HPF  04/18/18  01:55

## 2018-04-22 NOTE — PROGRESS NOTE
Assessment and Plan





Pt feeling better.  less pain. requiring less narcotics.





Abd soft.  pain now completely localized to RLQ  but less.   no further muscle 

guarding.  +BS





clinically improving.





continue NPO





f/u CT abd on tues





 Selected Entries











  04/22/18





  14:56


 


Temperature 98.0 F


 


Pulse Rate 59 L


 


Respiratory 16





Rate 


 


Blood Pressure 125/77








 Laboratory Tests











  04/21/18





  07:50


 


WBC  4.9


 


Hgb  14.1


 


Hct  41.2














Objective


 Vital Signs - 12hr











  04/22/18





  14:56


 


Temperature 98.0 F


 


Pulse Rate 59 L


 


Respiratory 16





Rate 


 


Blood Pressure 125/77


 


O2 Sat by Pulse 97





Oximetry 














- Labs





 04/21/18 07:50





 04/21/18 07:50

## 2018-04-23 NOTE — PROGRESS NOTE
Assessment and Plan





Assessment and Plan


Assessment and plan: 





Patient is a 56 yo man with a history of hypertension, tobacco dependency, 

insomnia, dyslipidemia and recurrent diverticulitis who presents to the ED with 

severe right lower quadrant non radiating pains, achy, throbbing and non-

radiating that started on Monday as intermittent pains, now constant and severe 

without aggravating or relieving factors. He denies n/v/fevers/chills/cough/cp/

sob. The abd pains is similar to prior attacks; his last attack was about 1 

year ago. He usually is treated with 2 iv abx for couple of days then 

discharge. He is followed closely by GI, Dr. Sandoval. 





CT abd/pelvis with iv contrast IMPRESSION: Acute sigmoid diverticulitis. Trace 

free peritoneal air is identified but no evidence for abscess at this time. 

Bilateral renal cysts. Right renal calculus, nonobstructing. 





-Sepsis, poa as evident by heart rate 98, wbc 13.7 with diverticulitits: treat 

with ivf, iv abx


-Acute Sigmoid Diverticulitis with microperfortation: bowel rest, iv abx, 

General surgery following, pain control with iv narcotics


Rpt CT scan


-Hypertension: hold oral antihypertensive, use iv labetalol prn


-Tobacco dependency: counseled on stopping


-Insomnia: hold oral ambien, use prn iv ativan


-hypokalemia; repleted


Dvt/gi ppx reviewed








Subjective


Date of service: 04/23/18


Principal diagnosis: Sigmoid diverticulitis with microperforation


Interval history: 


Doing well








Objective





- Constitutional


Vitals: 


 Vital Signs - 12hr











  04/23/18





  07:35


 


Temperature 97.7 F


 


Pulse Rate 65


 


Respiratory 18





Rate 


 


Blood Pressure 112/74


 


O2 Sat by Pulse 93





Oximetry 











General appearance: Present: no acute distress, well-nourished





- EENT


Eyes: PERRL, EOM intact


ENT: hearing intact, clear oral mucosa


Ears: bilateral: normal





- Neck


Neck: supple, normal ROM





- Respiratory


Respiratory effort: normal


Respiratory: bilateral: CTA





- Breasts


Breasts: normal





- Cardiovascular


Rhythm: regular


Heart Sounds: Present: S1 & S2.  Absent: gallop, rub


Extremities: pulses intact, No edema, normal color, Full ROM





- Gastrointestinal


General gastrointestinal: Present: soft, non-tender, non-distended, normal 

bowel sounds





- Genitourinary


Male genitourinary: normal





- Integumentary


Integumentary: clear, warm, dry





- Musculoskeletal


Musculoskeletal: 1, strength equal bilaterally





- Neurologic


Neurologic: moves all extremities





- Psychiatric


Psychiatric: memory intact, appropriate mood/affect, intact judgment & insight





- Labs


CBC & Chem 7: 


 04/24/18 05:40





 04/24/18 05:40


Labs: 


 Abnormal lab results











  04/23/18 04/23/18 Range/Units





  04:55 04:55 


 


MCV  96 H   (84-94)  fl


 


MCH  33 H   (28-32)  pg


 


Mono % (Auto)  11.3 H   (0.0-7.3)  %


 


Potassium   3.5 L  (3.6-5.0)  mmol/L


 


BUN   6 L  (9-20)  mg/dL


 


Creatinine   0.7 L  (0.8-1.5)  mg/dL


 


Glucose   106 H  ()  mg/dL


 


Calcium   8.3 L  (8.4-10.2)  mg/dL

## 2018-04-23 NOTE — PROGRESS NOTE
Assessment and Plan





Pt feeling well without compl.





Abd soft  non tender at present.





acute recurrent diverticulitis


pain resolved





for f/u CT in am





continue present care





probable cl liq diet in am pending f/u CT findings





Objective


 Vital Signs - 12hr











  04/23/18





  07:35


 


Temperature 97.7 F


 


Pulse Rate 65


 


Respiratory 18





Rate 


 


Blood Pressure 112/74


 


O2 Sat by Pulse 93





Oximetry 














- Labs





 04/23/18 04:55





 04/23/18 04:55


 Diabetes panel











  04/23/18 Range/Units





  04:55 


 


Sodium  142  (137-145)  mmol/L


 


Potassium  3.5 L  (3.6-5.0)  mmol/L


 


Chloride  101.9  ()  mmol/L


 


Carbon Dioxide  27  (22-30)  mmol/L


 


BUN  6 L  (9-20)  mg/dL


 


Creatinine  0.7 L  (0.8-1.5)  mg/dL


 


Glucose  106 H  ()  mg/dL


 


Calcium  8.3 L  (8.4-10.2)  mg/dL








 Calcium panel











  04/23/18 Range/Units





  04:55 


 


Calcium  8.3 L  (8.4-10.2)  mg/dL


 


Phosphorus  3.00  (2.5-4.5)  mg/dL








 Pituitary panel











  04/23/18 Range/Units





  04:55 


 


Sodium  142  (137-145)  mmol/L


 


Potassium  3.5 L  (3.6-5.0)  mmol/L


 


Chloride  101.9  ()  mmol/L


 


Carbon Dioxide  27  (22-30)  mmol/L


 


BUN  6 L  (9-20)  mg/dL


 


Creatinine  0.7 L  (0.8-1.5)  mg/dL


 


Glucose  106 H  ()  mg/dL


 


Calcium  8.3 L  (8.4-10.2)  mg/dL








 Adrenal panel











  04/23/18 Range/Units





  04:55 


 


Sodium  142  (137-145)  mmol/L


 


Potassium  3.5 L  (3.6-5.0)  mmol/L


 


Chloride  101.9  ()  mmol/L


 


Carbon Dioxide  27  (22-30)  mmol/L


 


BUN  6 L  (9-20)  mg/dL


 


Creatinine  0.7 L  (0.8-1.5)  mg/dL


 


Glucose  106 H  ()  mg/dL


 


Calcium  8.3 L  (8.4-10.2)  mg/dL

## 2018-04-24 NOTE — PROGRESS NOTE
Assessment and Plan


Assessment and Plan





Patient is a 58 yo man with a history of hypertension, tobacco dependency, 

insomnia, dyslipidemia and recurrent diverticulitis who presents to the ED with 

severe right lower quadrant non radiating pains, achy, throbbing and non-

radiating that started on Monday as intermittent pains, now constant and severe 

without aggravating or relieving factors. He denies n/v/fevers/chills/cough/cp/

sob. The abd pains is similar to prior attacks; his last attack was about 1 

year ago. He usually is treated with 2 iv abx for couple of days then 

discharge. He is followed closely by GI, Dr. Sandoval. 





CT abd/pelvis with iv contrast IMPRESSION: Acute sigmoid diverticulitis. Trace 

free peritoneal air is identified but no evidence for abscess at this time. 

Bilateral renal cysts. Right renal calculus, nonobstructing. 





-Sepsis, poa as evident by heart rate 98, wbc 13.7 with diverticulitits: treat 

with ivf, iv abx


-Acute Sigmoid Diverticulitis with microperfortation: bowel rest, iv abx, 

General surgery following, pain control with iv narcotics-Improving


Probale discharge tomorrow if surgery agrees


Clear liquid diet


-Hypertension: hold oral antihypertensive, use iv labetalol prn


-Tobacco dependency: counseled on stopping


-Insomnia: hold oral ambien, use prn iv ativan


-hypokalemia; repleted


Dvt/gi ppx reviewed








Subjective


Date of service: 04/24/18


Principal diagnosis: Sigmoid diverticulitis with microperoration


Interval history: 


Doing well








Objective





- Constitutional


Vitals: 


 Vital Signs - 12hr











  04/24/18





  07:52


 


Temperature 98.2 F


 


Pulse Rate 67


 


Respiratory 20





Rate 


 


Blood Pressure 103/66


 


O2 Sat by Pulse 96





Oximetry 











General appearance: Present: no acute distress, well-nourished





- EENT


Eyes: PERRL, EOM intact


ENT: hearing intact, clear oral mucosa


Ears: bilateral: normal





- Neck


Neck: supple, normal ROM





- Respiratory


Respiratory effort: normal


Respiratory: bilateral: CTA





- Breasts


Breasts: normal





- Cardiovascular


Rhythm: regular


Heart Sounds: Present: S1 & S2.  Absent: gallop, rub


Extremities: pulses intact, No edema, normal color, Full ROM





- Gastrointestinal


General gastrointestinal: Present: soft, non-tender, non-distended, normal 

bowel sounds





- Genitourinary


Male genitourinary: normal





- Integumentary


Integumentary: clear, warm, dry





- Musculoskeletal


Musculoskeletal: 1, strength equal bilaterally





- Neurologic


Neurologic: moves all extremities





- Psychiatric


Psychiatric: memory intact, appropriate mood/affect, intact judgment & insight





- Labs


CBC & Chem 7: 


 04/24/18 05:40





 04/24/18 05:40


Labs: 


 Abnormal lab results











  04/24/18 04/24/18 Range/Units





  05:40 05:40 


 


MCV  95 H   (84-94)  fl


 


MCH  33 H   (28-32)  pg


 


Mono % (Auto)  8.9 H   (0.0-7.3)  %


 


Potassium   3.3 L  (3.6-5.0)  mmol/L


 


BUN   4 L  (9-20)  mg/dL


 


Creatinine   0.6 L  (0.8-1.5)  mg/dL


 


Glucose   108 H  ()  mg/dL

## 2018-04-24 NOTE — PROGRESS NOTE
Assessment and Plan





Pt feeling well without compl.





Abd soft,  non tender





f/u CT - almost complete resolution of diverticulitis.





once again instructed pt that he should proceed with semi-elective sigmoid 

colectomy in next 6-8 wks once inflammation completely subsides.   this way we 

can proceed with formal bowel prep and primary anastomosis.  Pt still appears 

reluctant.





will start cl liq diet





 Selected Entries











  04/23/18 04/24/18





  07:35 07:52


 


Temperature 97.7 F 98.2 F


 


Pulse Rate 65 67


 


Respiratory 18 20





Rate  


 


Blood Pressure 112/74 103/66








 Laboratory Tests











  04/23/18 04/23/18 04/24/18





  04:55 04:55 05:40


 


WBC  5.4   4.9


 


Hgb  13.9   14.6


 


Hct  40.6   42.3


 


Potassium   3.5 L 














Objective


 Vital Signs - 12hr











  04/24/18





  07:52


 


Temperature 98.2 F


 


Pulse Rate 67


 


Respiratory 20





Rate 


 


Blood Pressure 103/66


 


O2 Sat by Pulse 96





Oximetry 














- Labs





 04/24/18 05:40





 04/24/18 05:40


 Diabetes panel











  04/24/18 Range/Units





  05:40 


 


Sodium  143  (137-145)  mmol/L


 


Potassium  3.3 L  (3.6-5.0)  mmol/L


 


Chloride  105.8  ()  mmol/L


 


Carbon Dioxide  25  (22-30)  mmol/L


 


BUN  4 L  (9-20)  mg/dL


 


Creatinine  0.6 L  (0.8-1.5)  mg/dL


 


Glucose  108 H  ()  mg/dL


 


Calcium  8.7  (8.4-10.2)  mg/dL








 Calcium panel











  04/24/18 Range/Units





  05:40 


 


Calcium  8.7  (8.4-10.2)  mg/dL








 Pituitary panel











  04/24/18 Range/Units





  05:40 


 


Sodium  143  (137-145)  mmol/L


 


Potassium  3.3 L  (3.6-5.0)  mmol/L


 


Chloride  105.8  ()  mmol/L


 


Carbon Dioxide  25  (22-30)  mmol/L


 


BUN  4 L  (9-20)  mg/dL


 


Creatinine  0.6 L  (0.8-1.5)  mg/dL


 


Glucose  108 H  ()  mg/dL


 


Calcium  8.7  (8.4-10.2)  mg/dL








 Adrenal panel











  04/24/18 Range/Units





  05:40 


 


Sodium  143  (137-145)  mmol/L


 


Potassium  3.3 L  (3.6-5.0)  mmol/L


 


Chloride  105.8  ()  mmol/L


 


Carbon Dioxide  25  (22-30)  mmol/L


 


BUN  4 L  (9-20)  mg/dL


 


Creatinine  0.6 L  (0.8-1.5)  mg/dL


 


Glucose  108 H  ()  mg/dL


 


Calcium  8.7  (8.4-10.2)  mg/dL

## 2018-04-24 NOTE — CAT SCAN REPORT
CT ABDOMEN PELVIS WITHOUT CONTRAST:



HISTORY:  Followup diverticulitis with microperforation.



COMPARISON: 4/18/18.



TECHNIQUE:  Helical CT in 1.25mm intervals without IV contrast. 

Sagittal and coronal reconstructions. 





FINDINGS:



Sigmoid diverticulitis inflammation has nearly resolved since 4/18/18. 

Trace free air along the anterior abdominal wall has resolved. There is 

trace free air remaining in the right lower quadrant mesentery although 

it has decreased by 75%. There is no evidence for new inflammation or 

abscess.



The remainder of the examination is unchanged since 4/18/18.





IMPRESSION:

Near resolution of the sigmoid diverticulitis and microperforation 

since 4/18/18.

## 2018-04-25 NOTE — PROGRESS NOTE
Assessment and Plan





Pt feeling well without compl.  jay jay cl liq diet.  eager to go home





Abd soft,  non tender 





surgically stable





advance to full liq diet.  may d/c from surg perspective if diet jay jay





advance to solid high fiber diet at home in am





continue po Levaquin & Flagyl x 5 days





rto this Mon





 Selected Entries











  04/24/18 04/25/18





  21:10 07:44


 


Temperature 98.6 F 


 


Pulse Rate  68


 


Blood Pressure  116/64














Objective


 Vital Signs - 12hr











  04/25/18





  07:44


 


Pulse Rate 68


 


Respiratory 24





Rate 


 


Blood Pressure 116/64


 


O2 Sat by Pulse 92





Oximetry 














- Labs





 04/24/18 05:40





 04/24/18 05:40

## 2018-04-25 NOTE — DISCHARGE SUMMARY
Providers





- Providers


Date of Admission: 


04/18/18 10:08





Date of discharge: 04/25/18


Attending physician: 


MESERET STRICKLAND





 





04/18/18 10:06


Consult to Physician [CONS] Urgent 


   Comment: DR SANABRIA NOTIFIED [id number]


   Consulting Provider: ANNA SANABRIA


   Physician Instructions: 


   Reason For Exam: diveritculitis with microperf











Primary care physician: 


VIDYA MARTE








Hospitalization


Condition: Stable


Hospital course: 


Assessment and Plan








Patient is a 58 yo man with a history of hypertension, tobacco dependency, 

insomnia, dyslipidemia and recurrent diverticulitis who presents to the ED with 

severe right lower quadrant non radiating pains, achy, throbbing and non-

radiating that started on Monday as intermittent pains, now constant and severe 

without aggravating or relieving factors. He denies n/v/fevers/chills/cough/cp/

sob. The abd pains is similar to prior attacks; his last attack was about 1 

year ago. He usually is treated with 2 iv abx for couple of days then 

discharge. He is followed closely by GI, Dr. Sandoval. 





CT abd/pelvis with iv contrast IMPRESSION: Acute sigmoid diverticulitis. Trace 

free peritoneal air is identified but no evidence for abscess at this time. 

Bilateral renal cysts. Right renal calculus, nonobstructing. 





-Sepsis, poa as evident by heart rate 98, wbc 13.7 with diverticulitits: treat 

with ivf, iv abx


-Acute Sigmoid Diverticulitis with microperfortation: Improved.5 more days  of 

po Levaquin and Flagyl


 Discharge today 


Clear liquid diet and advance as tolerated


-Hypertension: oral antihypertensives


-Tobacco dependency: counseled on stopping


-Insomnia: oral ambien, 


-hypokalemia; repleted





Pt feeling well without compl.  jay jay cl liq diet.  eager to go home





Abd soft,  non tender 





surgically stable





advance to full liq diet.  may d/c from surg perspective if diet jay jay





advance to solid high fiber diet at home in am





continue po Levaquin & Flagyl x 5 days





rto this Mon








Disposition: DC-01 TO HOME OR SELFCARE


Time spent for discharge: 33 min





Core Measure Documentation





- Palliative Care


Palliative Care/ Comfort Measures: Not Applicable





- Core Measures


Any of the following diagnoses?: none





Exam





- Constitutional


Vitals: 


 











Temp Pulse Resp BP Pulse Ox


 


 98.6 F   68   24   116/64   92 


 


 04/24/18 21:10  04/25/18 07:44  04/25/18 07:44  04/25/18 07:44  04/25/18 07:44











General appearance: Present: no acute distress, well-nourished





- EENT


Eyes: Present: PERRL


ENT: hearing intact, clear oral mucosa





- Neck


Neck: Present: supple, normal ROM





- Respiratory


Respiratory effort: normal


Respiratory: bilateral: CTA





- Cardiovascular


Heart Sounds: Present: S1 & S2.  Absent: rub, click





- Extremities


Extremities: pulses symmetrical, No edema


Peripheral Pulses: within normal limits





- Abdominal


General gastrointestinal: Present: soft, non-tender, non-distended, normal 

bowel sounds


Male genitourinary: Present: normal





- Integumentary


Integumentary: Present: clear, warm, dry





- Musculoskeletal


Musculoskeletal: gait normal, strength equal bilaterally





- Psychiatric


Psychiatric: appropriate mood/affect, intact judgment & insight





- Neurologic


Neurologic: CNII-XII intact, moves all extremities





Plan


Activity: no restrictions


Diet: low salt


Follow up with: 


VIDYA MARTE MD [Primary Care Provider] - 3-5 Days


ANNA SANABRIA MD [Staff Physician] - 7 Days

## 2021-05-21 ENCOUNTER — OFFICE VISIT (OUTPATIENT)
Dept: URBAN - METROPOLITAN AREA CLINIC 118 | Facility: CLINIC | Age: 61
End: 2021-05-21

## 2021-06-25 ENCOUNTER — OFFICE VISIT (OUTPATIENT)
Dept: URBAN - METROPOLITAN AREA CLINIC 118 | Facility: CLINIC | Age: 61
End: 2021-06-25

## 2021-08-04 ENCOUNTER — OFFICE VISIT (OUTPATIENT)
Dept: URBAN - METROPOLITAN AREA CLINIC 118 | Facility: CLINIC | Age: 61
End: 2021-08-04

## 2022-04-11 ENCOUNTER — OFFICE VISIT (OUTPATIENT)
Dept: URBAN - METROPOLITAN AREA CLINIC 118 | Facility: CLINIC | Age: 62
End: 2022-04-11

## 2022-09-22 ENCOUNTER — HOSPITAL ENCOUNTER (INPATIENT)
Dept: HOSPITAL 5 - GIO | Age: 62
LOS: 4 days | Discharge: HOME | DRG: 700 | End: 2022-09-26
Attending: STUDENT IN AN ORGANIZED HEALTH CARE EDUCATION/TRAINING PROGRAM | Admitting: INTERNAL MEDICINE
Payer: COMMERCIAL

## 2022-09-22 DIAGNOSIS — Z71.3: ICD-10-CM

## 2022-09-22 DIAGNOSIS — Z82.49: ICD-10-CM

## 2022-09-22 DIAGNOSIS — E78.2: ICD-10-CM

## 2022-09-22 DIAGNOSIS — K21.9: ICD-10-CM

## 2022-09-22 DIAGNOSIS — F17.200: ICD-10-CM

## 2022-09-22 DIAGNOSIS — E66.9: ICD-10-CM

## 2022-09-22 DIAGNOSIS — Z88.8: ICD-10-CM

## 2022-09-22 DIAGNOSIS — N32.1: Primary | ICD-10-CM

## 2022-09-22 DIAGNOSIS — I10: ICD-10-CM

## 2022-09-22 DIAGNOSIS — K57.30: ICD-10-CM

## 2022-09-22 LAB
ALBUMIN SERPL-MCNC: 3.8 G/DL (ref 3.9–5)
ALT SERPL-CCNC: 11 UNITS/L (ref 7–56)
BAND NEUTROPHILS # (MANUAL): 0 K/MM3
BUN SERPL-MCNC: 8 MG/DL (ref 9–20)
BUN/CREAT SERPL: 13 %
CALCIUM SERPL-MCNC: 8.5 MG/DL (ref 8.4–10.2)
HCT VFR BLD CALC: 47.6 % (ref 35.5–45.6)
HEMOLYSIS INDEX: 77
HGB BLD-MCNC: 15.8 GM/DL (ref 11.8–15.2)
MCHC RBC AUTO-ENTMCNC: 33 % (ref 32–34)
MCV RBC AUTO: 95 FL (ref 84–94)
MYELOCYTES # (MANUAL): 0 K/MM3
PLATELET # BLD: 150 K/MM3 (ref 140–440)
PROMYELOCYTES # (MANUAL): 0 K/MM3
RBC # BLD AUTO: 5.02 M/MM3 (ref 3.65–5.03)
TOTAL CELLS COUNTED BLD: 100

## 2022-09-22 PROCEDURE — 80048 BASIC METABOLIC PNL TOTAL CA: CPT

## 2022-09-22 PROCEDURE — 85025 COMPLETE CBC W/AUTO DIFF WBC: CPT

## 2022-09-22 PROCEDURE — 36415 COLL VENOUS BLD VENIPUNCTURE: CPT

## 2022-09-22 PROCEDURE — 88305 TISSUE EXAM BY PATHOLOGIST: CPT

## 2022-09-22 PROCEDURE — 80053 COMPREHEN METABOLIC PANEL: CPT

## 2022-09-22 PROCEDURE — 0DBE8ZX EXCISION OF LARGE INTESTINE, VIA NATURAL OR ARTIFICIAL OPENING ENDOSCOPIC, DIAGNOSTIC: ICD-10-PCS | Performed by: SURGERY

## 2022-09-22 PROCEDURE — 74177 CT ABD & PELVIS W/CONTRAST: CPT

## 2022-09-22 PROCEDURE — 74019 RADEX ABDOMEN 2 VIEWS: CPT

## 2022-09-22 PROCEDURE — 85007 BL SMEAR W/DIFF WBC COUNT: CPT

## 2022-09-22 RX ADMIN — HEPARIN SODIUM SCH UNIT: 5000 INJECTION, SOLUTION INTRAVENOUS; SUBCUTANEOUS at 15:58

## 2022-09-22 RX ADMIN — FAMOTIDINE SCH MG: 10 INJECTION, SOLUTION INTRAVENOUS at 15:30

## 2022-09-22 RX ADMIN — FAMOTIDINE SCH MG: 10 INJECTION, SOLUTION INTRAVENOUS at 22:55

## 2022-09-22 RX ADMIN — HYDROMORPHONE HYDROCHLORIDE PRN MG: 1 INJECTION, SOLUTION INTRAMUSCULAR; INTRAVENOUS; SUBCUTANEOUS at 15:35

## 2022-09-22 RX ADMIN — Medication SCH ML: at 22:56

## 2022-09-22 RX ADMIN — HEPARIN SODIUM SCH UNIT: 5000 INJECTION, SOLUTION INTRAVENOUS; SUBCUTANEOUS at 22:55

## 2022-09-22 NOTE — ANESTHESIA CONSULTATION
Anesthesia Consult and Med Hx


Date of service: 09/22/22





- Airway


Anesthetic Teeth Evaluation: Poor (2 missing teeth )


ROM Head & Neck: Adequate


Mental/Hyoid Distance: Adequate


Mallampati Class: Class II


Intubation Access Assessment: Probably Good





- Pulmonary Exam


CTA: Yes





- Cardiac Exam


Cardiac Exam: RRR





- Pre-Operative Health Status


ASA Pre-Surgery Classification: ASA2


Proposed Anesthetic Plan: MAC





- Pulmonary


Hx Smoking: Yes (1 pack per week - quit 2-3 days ago)


Hx Asthma: No


COPD: No


Hx Sleep Apnea: No





- Cardiovascular System


Hx Hypertension: Yes (HLD)


Hx Coronary Artery Disease: No





- Central Nervous System


Hx Neuromuscular Disorder: No (neuropathy BLE)





- Gastrointestinal


Hx Gastroesophageal Reflux Disease: Yes (controlled with medication)





- Other Systems


Hx Obesity: Yes (BMI 34)





- Additional Comments


Anesthesia Medical History Comments: no hx of anesthetic complications

## 2022-09-22 NOTE — XRAY REPORT
ABDOMEN 2 VIEWS



INDICATION / CLINICAL INFORMATION: OBSTRUCTION SERIES.



COMPARISON: 4/24/2018



FINDINGS:



TUBES / LINES: None.

BOWEL GAS PATTERN: Mildly dilated loops of right-sided colon measuring 10 cm. Several mildly dilated 
loops of small bowel measuring 3.5 cm. Air-fluid levels are seen.

FREE AIR / EXTRALUMINAL GAS: None seen.



ADDITIONAL FINDINGS: No significant additional findings.



CHEST: Visualized chest shows no significant abnormality.



IMPRESSION:

1. Ileus versus developing obstruction. No radiographic evidence of perforation. Radiographic versus 
CT follow-up based on clinical concern.



Signer Name: Manoj Blevins MD 

Signed: 9/22/2022 12:16 PM

Workstation Name: [facility]

## 2022-09-22 NOTE — SHORT STAY SUMMARY
Short Stay Documentation


Date of service: 09/22/22





- History


H&P: obtained from office





- Allergies and Medications


Current Medications: 


                                    Allergies





sulfamethoxazole [From Bactrim] Allergy (Verified 04/05/17 01:24)


   Rash


trimethoprim [From Bactrim] Allergy (Verified 04/05/17 01:24)


   Rash





                                Home Medications











 Medication  Instructions  Recorded  Confirmed  Last Taken  Type


 


Ergocalciferol [Vitamin D2] 1 cap PO QWEEK 04/18/18 09/22/22 09/18/22 History


 


Omeprazole 20 mg PO DAILY #30 capsule. 04/25/18 09/22/22 09/21/22 Rx


 


Pravastatin [Pravachol] 20 mg PO QHS #30 tablet 04/25/18 09/22/22 09/21/22 Rx


 


Zolpidem (Nf) [Ambien (Nf)] 10 mg PO QHS #30 tablet 04/25/18 09/22/22 09/21/22 

Rx


 


amLODIPine 10 mg PO QDAY #30 tablet 04/25/18 09/22/22 09/21/22 Rx








Active Medications





Albuterol (Albuterol 2.5 Mg/3 Ml Nebu)  2.5 mg IH ONCE ONE


   Stop: 09/22/22 10:06


Sodium Chloride (Nacl 0.9% 1000 Ml)  1,000 mls @ 50 mls/hr IV AS DIRECT RYLEY


   Stop: 09/22/22 23:59


Ondansetron HCl (Ondansetron 4 Mg/2 Ml Inj)  4 mg IV Q4H PRN


   PRN Reason: Nausea And Vomiting


Simethicone (Simethicone 80 Mg Chew Tab)  80 mg PO ONCE RYLEY


   Stop: 09/22/22 23:00











- Brief post op/procedure progress note


Date of procedure: 09/22/22


Pre-op diagnosis: Colovesicular fistula


Post-op diagnosis: same


Procedure: 





Colonoscopy with biopsy and Yakelin ink tattooing at 38 cm


Anesthesia: MAC


Findings: 





Diverticulosis of the sigmoid colon with narrowing at 35 to 38 cm possible 

sessile polyps in the same area.  Prep is poor cecum is visualized.


Surgeon: FROY KOLB


Estimated blood loss: none


Pathology: list (Blood biopsy of polypoid lesions at 38 cm)





- Disposition


Condition at discharge: Good


Disposition: 01 HOME / SELF CARE / HOMELESS





Short Stay Discharge Plan


Activity: advance as tolerated


Follow up with: 


VIDYA MARTE MD [Primary Care Provider] - 7 Days


FROY KOLB MD [Staff Physician] - 7 Days

## 2022-09-22 NOTE — OPERATIVE REPORT
Operative Report


Operative Report: 





Date: 9/22/2022





Preop: Diagnosis: Colovesicular fistula with diverticulosis





Postop: Diagnosis same with possible polypoid lesions at 38 cm





Procedure: Colonoscopy with biopsy of polypoid lesions and Yakelin ink tattoo





Surgeon: Dr. López





Assistant: None





Anesthesia: MAC IV sedation





Estimated blood loss: None





Specimen: Biopsy of polypoid lesions at 38 cm





Findings: Patient is taken to the endoscopic suite timeouts are completed 

consent on the chart.





Rectal exam is performed and is normal.:  Scope was advanced through the rectum 

into the rectosigmoid colon.  At 35 cm the lumen of the colon in the sigmoid is 

narrow diverticulosis is seen initially no lesions are seen.





The sigmoid colon is inspected and is normal.  The transverse colon is 

identified and inspected and is normal.  Scope was then advanced down the 

ascending colon to the cecum.  The prep is noted to be moderate to poor.





The crow's foot ileocecal valve and appendiceal aperture are identified.  Tip of

the scope was confirmed to be in the right lower quadrant by palpation.





Scope was slowly withdrawn confirming the above findings.  At 38 cm several 

polypoid lesions are seen and biopsied tattoo ink is applied to this area at 

several locations circumferentially.  The scope was then removed and the 

procedure is ended.

## 2022-09-23 LAB
BASOPHILS # (AUTO): 0 K/MM3 (ref 0–0.1)
BASOPHILS NFR BLD AUTO: 0.2 % (ref 0–1.8)
BUN SERPL-MCNC: 13 MG/DL (ref 9–20)
BUN/CREAT SERPL: 19 %
CALCIUM SERPL-MCNC: 8.9 MG/DL (ref 8.4–10.2)
EOSINOPHIL # BLD AUTO: 0 K/MM3 (ref 0–0.4)
EOSINOPHIL NFR BLD AUTO: 0.1 % (ref 0–4.3)
HCT VFR BLD CALC: 45.7 % (ref 35.5–45.6)
HEMOLYSIS INDEX: 0
HGB BLD-MCNC: 15.1 GM/DL (ref 11.8–15.2)
LYMPHOCYTES # BLD AUTO: 1.1 K/MM3 (ref 1.2–5.4)
LYMPHOCYTES NFR BLD AUTO: 11.8 % (ref 13.4–35)
MCHC RBC AUTO-ENTMCNC: 33 % (ref 32–34)
MCV RBC AUTO: 93 FL (ref 84–94)
MONOCYTES # (AUTO): 0.7 K/MM3 (ref 0–0.8)
MONOCYTES % (AUTO): 7.8 % (ref 0–7.3)
PLATELET # BLD: 156 K/MM3 (ref 140–440)
RBC # BLD AUTO: 4.93 M/MM3 (ref 3.65–5.03)

## 2022-09-23 RX ADMIN — HEPARIN SODIUM SCH UNIT: 5000 INJECTION, SOLUTION INTRAVENOUS; SUBCUTANEOUS at 22:11

## 2022-09-23 RX ADMIN — FAMOTIDINE SCH MG: 10 INJECTION, SOLUTION INTRAVENOUS at 10:10

## 2022-09-23 RX ADMIN — HYDROMORPHONE HYDROCHLORIDE PRN MG: 1 INJECTION, SOLUTION INTRAMUSCULAR; INTRAVENOUS; SUBCUTANEOUS at 22:10

## 2022-09-23 RX ADMIN — HYDROMORPHONE HYDROCHLORIDE PRN MG: 1 INJECTION, SOLUTION INTRAMUSCULAR; INTRAVENOUS; SUBCUTANEOUS at 06:29

## 2022-09-23 RX ADMIN — FAMOTIDINE SCH MG: 10 INJECTION, SOLUTION INTRAVENOUS at 22:11

## 2022-09-23 RX ADMIN — DEXTROSE SCH MLS/HR: 5 SOLUTION INTRAVENOUS at 10:51

## 2022-09-23 RX ADMIN — ZOLPIDEM TARTRATE PRN MG: 5 TABLET ORAL at 22:17

## 2022-09-23 RX ADMIN — CEFEPIME SCH MLS/HR: 2 INJECTION, POWDER, FOR SOLUTION INTRAVENOUS at 10:07

## 2022-09-23 RX ADMIN — Medication SCH ML: at 22:11

## 2022-09-23 RX ADMIN — Medication SCH: at 10:48

## 2022-09-23 RX ADMIN — HEPARIN SODIUM SCH UNIT: 5000 INJECTION, SOLUTION INTRAVENOUS; SUBCUTANEOUS at 10:25

## 2022-09-23 RX ADMIN — CEFEPIME SCH MLS/HR: 2 INJECTION, POWDER, FOR SOLUTION INTRAVENOUS at 18:50

## 2022-09-23 RX ADMIN — CEFEPIME SCH MLS/HR: 2 INJECTION, POWDER, FOR SOLUTION INTRAVENOUS at 23:52

## 2022-09-23 RX ADMIN — HYDROMORPHONE HYDROCHLORIDE PRN MG: 1 INJECTION, SOLUTION INTRAMUSCULAR; INTRAVENOUS; SUBCUTANEOUS at 15:09

## 2022-09-23 RX ADMIN — HYDROMORPHONE HYDROCHLORIDE PRN MG: 1 INJECTION, SOLUTION INTRAMUSCULAR; INTRAVENOUS; SUBCUTANEOUS at 00:42

## 2022-09-23 RX ADMIN — ZOLPIDEM TARTRATE PRN MG: 5 TABLET ORAL at 00:42

## 2022-09-23 NOTE — PROGRESS NOTE
Assessment and Plan





Patient is status post colonoscopy for a colovesicular fistula done yesterday.  

Postoperatively he had fairly severe pain 10 of 10 scale with distention.  He 

has a long history of recurrent diverticulitis with annual attacks for the last 

10 years.  On this current year he spent 2 different admissions at the [facility] managing diverticulitis of the sigmoid colon with an abscess 

formation on the right lower quadrant.





Through the night the pain is improved but still fairly significant and 

something like a 6-8 over 10.  Patient did have some nausea through the night 

but is prepared to try some food today.  His white count today is 11,000.





Subjective


Date of service: 09/23/22


Narrative: 





Patient is status post colonoscopy for a colovesicular fistula done yesterday.  

Postoperatively he had fairly severe pain 10 of 10 scale with distention.  He 

has a long history of recurrent diverticulitis with annual attacks for the last 

10 years.  On this current year he spent 2 different admissions at the [facility] managing diverticulitis of the sigmoid colon with an abscess 

formation on the right lower quadrant.





Through the night the pain is improved but still fairly significant and 

something like a 6-8 over 10.  Patient did have some nausea through the night 

but is prepared to try some food today.  His white count today is 11,000.





Objective


                               Vital Signs - 12hr











  09/23/22 09/23/22





  03:32 05:51


 


Temperature  98.0 F


 


Pulse Rate  52 L


 


Respiratory 18 20





Rate  


 


Blood Pressure  131/74


 


O2 Sat by Pulse 98 95





Oximetry  














- Labs





                                 09/22/22 16:10





                                 09/22/22 16:10


                                 Diabetes panel











  09/22/22 Range/Units





  16:10 


 


Sodium  141  (137-145)  mmol/L


 


Potassium  4.2  (3.6-5.0)  mmol/L


 


Chloride  109.3 H  ()  mmol/L


 


Carbon Dioxide  18 L  (22-30)  mmol/L


 


BUN  8 L  (9-20)  mg/dL


 


Creatinine  0.6 L  (0.8-1.3)  mg/dL


 


Glucose  106 H  ()  mg/dL


 


Calcium  8.5  (8.4-10.2)  mg/dL


 


AST  14  (5-40)  units/L


 


ALT  11  (7-56)  units/L


 


Alkaline Phosphatase  98  ()  units/L


 


Total Protein  5.6 L  (6.3-8.2)  g/dL


 


Albumin  3.8 L  (3.9-5)  g/dL








                                  Calcium panel











  09/22/22 Range/Units





  16:10 


 


Calcium  8.5  (8.4-10.2)  mg/dL


 


Albumin  3.8 L  (3.9-5)  g/dL








                                 Pituitary panel











  09/22/22 Range/Units





  16:10 


 


Sodium  141  (137-145)  mmol/L


 


Potassium  4.2  (3.6-5.0)  mmol/L


 


Chloride  109.3 H  ()  mmol/L


 


Carbon Dioxide  18 L  (22-30)  mmol/L


 


BUN  8 L  (9-20)  mg/dL


 


Creatinine  0.6 L  (0.8-1.3)  mg/dL


 


Glucose  106 H  ()  mg/dL


 


Calcium  8.5  (8.4-10.2)  mg/dL








                                  Adrenal panel











  09/22/22 Range/Units





  16:10 


 


Sodium  141  (137-145)  mmol/L


 


Potassium  4.2  (3.6-5.0)  mmol/L


 


Chloride  109.3 H  ()  mmol/L


 


Carbon Dioxide  18 L  (22-30)  mmol/L


 


BUN  8 L  (9-20)  mg/dL


 


Creatinine  0.6 L  (0.8-1.3)  mg/dL


 


Glucose  106 H  ()  mg/dL


 


Calcium  8.5  (8.4-10.2)  mg/dL


 


Total Bilirubin  0.40  (0.1-1.2)  mg/dL


 


AST  14  (5-40)  units/L


 


ALT  11  (7-56)  units/L


 


Alkaline Phosphatase  98  ()  units/L


 


Total Protein  5.6 L  (6.3-8.2)  g/dL


 


Albumin  3.8 L  (3.9-5)  g/dL

## 2022-09-23 NOTE — PROGRESS NOTE
Assessment and Plan


Assessment and plan: 








#Colovesicular fistula


 Status post repair by general surgery on 9/22/2022


 General surgery consulted; appreciate recs


 Continue antiemetics and analgesics as needed.  Starting clear liquid diet 

tomorrow morning.





#Hypertension


#Hyperlipidemia


- home medications: Amlodipine 5 mg daily and atorvastatin 40 mg daily


- current medications: Amlodipine 5 mg daily and atorvastatin 40 mg daily


- SBP goal <160 and DBP goal <90 while inpatient


- continue to monitor





#GERD


Continue IV Pepcid 20 mg twice daily.  Can transition to home omeprazole 40 mg 

daily when patient can tolerate p.o. intake.





#Obesity


#Weight loss counseling


#Exercise counseling


- BMI 34.2


- Counseled patient on the importance of weight loss, incorporating exercise, 

and dietary changes (lean meats, fresh fruits and vegetables, and water intake).

Patient expresses understanding. 


- Time: +15 min





#Advanced care planning


-Disease education conducted, care plan discussed, diagnoses discussed, 

prognosis discussed, and patient acknowledges understanding with care plan


-Time: +30 min


Disposition Plan: Continue medical management


Total Time Spent with Patient (Minutes): 45 minutes





History


Interval history: 





Patient is status post repair of colovesicular fistula by general surgery 

yesterday.  Patient tolerated the procedure well well





Hospitalist Physical





- Constitutional


Vitals: 


                                        











Temp Pulse Resp BP Pulse Ox


 


 98.5 F   66   20   120/74   94 


 


 09/23/22 16:20  09/23/22 16:20  09/23/22 16:20  09/23/22 16:20  09/23/22 16:20











General appearance: Present: mild distress, well-nourished, obese





- EENT


Eyes: Present: PERRL, EOM intact


ENT: hearing intact, clear oral mucosa, dentition normal





- Neck


Neck: Present: supple, normal ROM





- Respiratory


Respiratory effort: normal


Respiratory: bilateral: CTA





- Cardiovascular


Rhythm: regular


Heart Sounds: Present: S1 & S2





- Extremities


Extremities: no ischemia, pulses intact, pulses symmetrical, No edema, normal 

temperature, normal color


Peripheral Pulses: within normal limits





- Abdominal


General gastrointestinal: soft, tender, non-distended, normal bowel sounds


Localized gastrointestinal: tender: RLQ, LLQ, epigastric periumbilical





- Integumentary


Integumentary: Present: clear, warm, dry





- Psychiatric


Psychiatric: appropriate mood/affect, intact judgment & insight, memory intact, 

cooperative





- Neurologic


Neurologic: CNII-XII intact, moves all extremities





- Allied Health


Allied health notes reviewed: nursing





Results





- Labs


CBC & Chem 7: 


                                 09/23/22 10:46





                                 09/23/22 10:46


Labs: 


                             Laboratory Last Values











WBC  9.0 K/mm3 (4.5-11.0)   09/23/22  10:46    


 


RBC  4.93 M/mm3 (3.65-5.03)   09/23/22  10:46    


 


Hgb  15.1 gm/dl (11.8-15.2)   09/23/22  10:46    


 


Hct  45.7 % (35.5-45.6)  H  09/23/22  10:46    


 


MCV  93 fl (84-94)   09/23/22  10:46    


 


MCH  31 pg (28-32)   09/23/22  10:46    


 


MCHC  33 % (32-34)   09/23/22  10:46    


 


RDW  15.5 % (13.2-15.2)  H  09/23/22  10:46    


 


Plt Count  156 K/mm3 (140-440)   09/23/22  10:46    


 


Lymph % (Auto)  11.8 % (13.4-35.0)  L  09/23/22  10:46    


 


Mono % (Auto)  7.8 % (0.0-7.3)  H  09/23/22  10:46    


 


Eos % (Auto)  0.1 % (0.0-4.3)   09/23/22  10:46    


 


Baso % (Auto)  0.2 % (0.0-1.8)   09/23/22  10:46    


 


Lymph # (Auto)  1.1 K/mm3 (1.2-5.4)  L  09/23/22  10:46    


 


Mono # (Auto)  0.7 K/mm3 (0.0-0.8)   09/23/22  10:46    


 


Eos # (Auto)  0.0 K/mm3 (0.0-0.4)   09/23/22  10:46    


 


Baso # (Auto)  0.0 K/mm3 (0.0-0.1)   09/23/22  10:46    


 


Add Manual Diff  Complete   09/22/22  16:10    


 


Total Counted  100   09/22/22  16:10    


 


Seg Neutrophils %  80.1 % (40.0-70.0)  H  09/23/22  10:46    


 


Band Neutrophils %  0 %  09/22/22  16:10    


 


Lymphocytes % (Manual)  2.0 % (13.4-35.0)  L  09/22/22  16:10    


 


Reactive Lymphs % (Man)  0 %  09/22/22  16:10    


 


Monocytes % (Manual)  1.0 % (0.0-7.3)   09/22/22  16:10    


 


Eosinophils % (Manual)  0 % (0.0-4.3)   09/22/22  16:10    


 


Basophils % (Manual)  0 % (0.0-1.8)   09/22/22  16:10    


 


Metamyelocytes %  0 %  09/22/22  16:10    


 


Myelocytes %  0 %  09/22/22  16:10    


 


Promyelocytes %  0 %  09/22/22  16:10    


 


Blast Cells %  0 %  09/22/22  16:10    


 


Nucleated RBC %  Not Reportable   09/22/22  16:10    


 


Seg Neutrophils #  7.2 K/mm3 (1.8-7.7)   09/23/22  10:46    


 


Seg Neutrophils # Man  11.5 K/mm3 (1.8-7.7)  H  09/22/22  16:10    


 


Band Neutrophils #  0.0 K/mm3  09/22/22  16:10    


 


Lymphocytes # (Manual)  0.2 K/mm3 (1.2-5.4)  L  09/22/22  16:10    


 


Abs React Lymphs (Man)  0.0 K/mm3  09/22/22  16:10    


 


Monocytes # (Manual)  0.1 K/mm3 (0.0-0.8)   09/22/22  16:10    


 


Eosinophils # (Manual)  0.0 K/mm3 (0.0-0.4)   09/22/22  16:10    


 


Basophils # (Manual)  0.0 K/mm3 (0.0-0.1)   09/22/22  16:10    


 


Metamyelocytes #  0.0 K/mm3  09/22/22  16:10    


 


Myelocytes #  0.0 K/mm3  09/22/22  16:10    


 


Promyelocytes #  0.0 K/mm3  09/22/22  16:10    


 


Blast Cells #  0.0 K/mm3  09/22/22  16:10    


 


WBC Morphology  Not Reportable   09/22/22  16:10    


 


WBC Morphology  TNR   09/22/22  16:10    


 


Hypersegmented Neuts  Not Reportable   09/22/22  16:10    


 


Hyposegmented Neuts  Not Reportable   09/22/22  16:10    


 


Hypogranular Neuts  Not Reportable   09/22/22  16:10    


 


Smudge Cells  Not Reportable   09/22/22  16:10    


 


Toxic Granulation  Not Reportable   09/22/22  16:10    


 


Toxic Vacuolation  Not Reportable   09/22/22  16:10    


 


Dohle Bodies  Not Reportable   09/22/22  16:10    


 


Pelger-Huet Anomaly  Not Reportable   09/22/22  16:10    


 


Alejandra Rods  Not Reportable   09/22/22  16:10    


 


Platelet Estimate  Consistent w auto   09/22/22  16:10    


 


Clumped Platelets  Not Reportable   09/22/22  16:10    


 


Plt Clumps, EDTA  Not Reportable   09/22/22  16:10    


 


Large Platelets  Not Reportable   09/22/22  16:10    


 


Giant Platelets  Not Reportable   09/22/22  16:10    


 


Platelet Satelliting  Not Reportable   09/22/22  16:10    


 


Plt Morphology Comment  Not Reportable   09/22/22  16:10    


 


RBC Morphology  Normal   09/22/22  16:10    


 


Dimorphic RBCs  Not Reportable   09/22/22  16:10    


 


Polychromasia  Not Reportable   09/22/22  16:10    


 


Hypochromasia  Not Reportable   09/22/22  16:10    


 


Poikilocytosis  Not Reportable   09/22/22  16:10    


 


Anisocytosis  Not Reportable   09/22/22  16:10    


 


Microcytosis  Not Reportable   09/22/22  16:10    


 


Macrocytosis  Not Reportable   09/22/22  16:10    


 


Spherocytes  Not Reportable   09/22/22  16:10    


 


Pappenheimer Bodies  Not Reportable   09/22/22  16:10    


 


Sickle Cells  Not Reportable   09/22/22  16:10    


 


Target Cells  Not Reportable   09/22/22  16:10    


 


Tear Drop Cells  Not Reportable   09/22/22  16:10    


 


Ovalocytes  Not Reportable   09/22/22  16:10    


 


Helmet Cells  Not Reportable   09/22/22  16:10    


 


Cameron-[place] Bodies  Not Reportable   09/22/22  16:10    


 


Cabot Rings  Not Reportable   09/22/22  16:10    


 


Ruben Cells  Not Reportable   09/22/22  16:10    


 


Bite Cells  Not Reportable   09/22/22  16:10    


 


Crenated Cell  Not Reportable   09/22/22  16:10    


 


Elliptocytes  Not Reportable   09/22/22  16:10    


 


Acanthocytes (Spur)  Not Reportable   09/22/22  16:10    


 


Rouleaux  Not Reportable   09/22/22  16:10    


 


Hemoglobin C Crystals  Not Reportable   09/22/22  16:10    


 


Schistocytes  Not Reportable   09/22/22  16:10    


 


Malaria parasites  Not Reportable   09/22/22  16:10    


 


Anish Bodies  Not Reportable   09/22/22  16:10    


 


Hem Pathologist Commnt  No   09/22/22  16:10    


 


Sodium  141 mmol/L (137-145)   09/23/22  10:46    


 


Potassium  4.2 mmol/L (3.6-5.0)   09/23/22  10:46    


 


Chloride  105.4 mmol/L ()   09/23/22  10:46    


 


Carbon Dioxide  24 mmol/L (22-30)   09/23/22  10:46    


 


Anion Gap  16 mmol/L  09/23/22  10:46    


 


BUN  13 mg/dL (9-20)   09/23/22  10:46    


 


Creatinine  0.7 mg/dL (0.8-1.3)  L  09/23/22  10:46    


 


Estimated GFR  > 60 ml/min  09/23/22  10:46    


 


BUN/Creatinine Ratio  19 %  09/23/22  10:46    


 


Glucose  125 mg/dL ()  H  09/23/22  10:46    


 


Calcium  8.9 mg/dL (8.4-10.2)   09/23/22  10:46    


 


Total Bilirubin  0.40 mg/dL (0.1-1.2)   09/22/22  16:10    


 


AST  14 units/L (5-40)   09/22/22  16:10    


 


ALT  11 units/L (7-56)   09/22/22  16:10    


 


Alkaline Phosphatase  98 units/L ()   09/22/22  16:10    


 


Total Protein  5.6 g/dL (6.3-8.2)  L  09/22/22  16:10    


 


Albumin  3.8 g/dL (3.9-5)  L  09/22/22  16:10    


 


Albumin/Globulin Ratio  2.1 %  09/22/22  16:10    











Echols/IV: 


                                        





Voiding Method                   Toilet











Active Medications





- Current Medications


Current Medications: 














Generic Name Dose Route Start Last Admin





  Trade Name Roderickq  PRN Reason Stop Dose Admin


 


Acetaminophen  650 mg  09/22/22 14:10 





  Acetaminophen 325 Mg Tab  PO  





  Q4H PRN  





  Pain MILD(1-3)/Fever >100.5/HA  


 


Famotidine  20 mg  09/22/22 15:00  09/23/22 10:10





  Famotidine 20 Mg/2 Ml Inj  IV   20 mg





  BID RYLEY   Administration


 


Heparin Sodium (Porcine)  5,000 unit  09/22/22 14:15  09/23/22 10:25





  Heparin 5,000 Unit/1 Ml Vial  SUB-Q   5,000 unit





  Q12HR RYLEY   Administration


 


Hydromorphone HCl  1 mg  09/23/22 13:00  09/23/22 15:09





  Hydromorphone 1 Mg/1 Ml Inj  IV   1 mg





  Q4H PRN   Administration





  Pain , Severe (7-10)  


 


Cefepime HCl  2 gm in 100 mls @ 200 mls/hr  09/23/22 08:00  09/23/22 10:07





  Cefepime/Ns 2 Gm/100 Ml  IV   200 mls/hr





  Q8H RYLEY   Administration





  Protocol  


 


Dextrose  1,000 mls @ 75 mls/hr  09/23/22 08:00  09/23/22 10:51





  D5w  IV   75 mls/hr





  AS DIRECT RYLEY   Administration


 


Morphine Sulfate  2 mg  09/22/22 14:10  09/22/22 19:03





  Morphine 2 Mg/1 Ml Inj  IV   2 mg





  Q4H PRN   Administration





  Pain, Moderate (4-6)  


 


Ondansetron HCl  4 mg  09/22/22 14:10  09/22/22 22:55





  Ondansetron 4 Mg/2 Ml Inj  IV   4 mg





  Q8H PRN   Administration





  Nausea And Vomiting  


 


Sodium Chloride  10 ml  09/22/22 22:00  09/23/22 10:48





  Sodium Chloride 0.9% 10 Ml Flush Syringe  IV   Not Given





  BID RYLEY  


 


Sodium Chloride  10 ml  09/22/22 14:10 





  Sodium Chloride 0.9% 10 Ml Flush Syringe  IV  





  PRN PRN  





  LINE FLUSH  


 


Zolpidem Tartrate  5 mg  09/22/22 22:36  09/23/22 00:42





  Zolpidem 5 Mg Tab  PO   5 mg





  QHS PRN   Administration





  Sleep

## 2022-09-23 NOTE — HISTORY AND PHYSICAL REPORT
History of Present Illness


Date of examination: 09/22/22


Date of admission: 


09/22/22 14:10





Chief complaint: 


Postop admission for observation





History of present illness: 


Patient had colovesicular fistula and diverticulitis which was surgically 

repaired.  Patient continues to have abdominal pain for which patient being 

admitted for observation at the request of surgery.  Patient has history of 

hypertension and GERD and hyperlipidemia and vitamin D deficiency.  Postop 

patient doing well.








Past History


Past Medical History: GERD, hypertension, hyperlipidemia, other (Vitamin D 

deficiency)


Past Surgical History: Other (Colovesicular fistula correction)


Social history: lives with family, full code


Family history: hypertension





Medications and Allergies


                                    Allergies











Allergy/AdvReac Type Severity Reaction Status Date / Time


 


sulfamethoxazole Allergy  Rash Verified 04/05/17 01:24





[From Bactrim]     


 


trimethoprim [From Bactrim] Allergy  Rash Verified 04/05/17 01:24











                                Home Medications











 Medication  Instructions  Recorded  Confirmed  Last Taken  Type


 


Ergocalciferol [Vitamin D2] 1 cap PO QWEEK 04/18/18 09/22/22 09/18/22 History


 


Omeprazole 20 mg PO DAILY #30 capsule. 04/25/18 09/22/22 09/21/22 Rx


 


Pravastatin [Pravachol] 20 mg PO QHS #30 tablet 04/25/18 09/22/22 09/21/22 Rx


 


Zolpidem (Nf) [Ambien (Nf)] 10 mg PO QHS #30 tablet 04/25/18 09/22/22 09/21/22 

Rx


 


amLODIPine 10 mg PO QDAY #30 tablet 04/25/18 09/22/22 09/21/22 Rx











Active Meds: 


Active Medications





Acetaminophen (Acetaminophen 325 Mg Tab)  650 mg PO Q4H PRN


   PRN Reason: Pain MILD(1-3)/Fever >100.5/HA


Famotidine (Famotidine 20 Mg/2 Ml Inj)  20 mg IV BID [facility]


   Last Admin: 09/22/22 22:55 Dose:  20 mg


   


Heparin Sodium (Porcine) (Heparin 5,000 Unit/1 Ml Vial)  5,000 unit SUB-Q Q12HR 

[facility]


   Last Admin: 09/22/22 22:55 Dose:  5,000 unit


   


Hydromorphone HCl (Hydromorphone 0.5 Mg/0.5 Ml Inj)  0.5 mg IV Q3H PRN


   PRN Reason: Pain , Severe (7-10)


   Last Admin: 09/23/22 06:29 Dose:  0.5 mg


   


Metoclopramide HCl (Metoclopramide 10 Mg/2 Ml Inj)  10 mg IV Q6H PRN


   PRN Reason: Nausea And Vomiting


   Last Admin: 09/23/22 06:29 Dose:  10 mg


   


Morphine Sulfate (Morphine 2 Mg/1 Ml Inj)  2 mg IV Q4H PRN


   PRN Reason: Pain, Moderate (4-6)


   Last Admin: 09/22/22 19:03 Dose:  2 mg


   


Ondansetron HCl (Ondansetron 4 Mg/2 Ml Inj)  4 mg IV Q8H PRN


   PRN Reason: Nausea And Vomiting


   Last Admin: 09/22/22 22:55 Dose:  4 mg


   


Sodium Chloride (Sodium Chloride 0.9% 10 Ml Flush Syringe)  10 ml IV BID [facility]


   Last Admin: 09/22/22 22:56 Dose:  10 ml


   


Sodium Chloride (Sodium Chloride 0.9% 10 Ml Flush Syringe)  10 ml IV PRN PRN


   PRN Reason: LINE FLUSH


Zolpidem Tartrate (Zolpidem 5 Mg Tab)  5 mg PO QHS PRN


   PRN Reason: Sleep


   Last Admin: 09/23/22 00:42 Dose:  5 mg


   











Review of Systems


All systems: negative


Gastrointestinal: abdominal pain, nausea





Exam





- Constitutional


Vitals: 


                                        











Temp Pulse Resp BP Pulse Ox


 


 98.0 F   52 L  20   131/74   95 


 


 09/23/22 05:51  09/23/22 05:51  09/23/22 05:51  09/23/22 05:51  09/23/22 05:51











General appearance: Present: mild distress, well-nourished





- EENT


Eyes: Present: PERRL


ENT: hearing intact, clear oral mucosa





- Neck


Neck: Present: supple, normal ROM





- Respiratory


Respiratory effort: normal


Respiratory: bilateral: CTA





- Cardiovascular


Heart rate: 72


Rhythm: regular


Heart Sounds: Present: S1 & S2.  Absent: rub, click





- Extremities


Extremities: pulses symmetrical, No edema


Peripheral Pulses: within normal limits





- Abdominal


General gastrointestinal: Present: soft, non-tender, non-distended, normal bowel

sounds


Male genitourinary: Present: normal





- Integumentary


Integumentary: Present: clear, warm, dry





- Musculoskeletal


Musculoskeletal: gait normal, strength equal bilaterally





- Psychiatric


Psychiatric: appropriate mood/affect, intact judgment & insight





- Neurologic


Neurologic: CNII-XII intact, moves all extremities





Results





- Labs


CBC & Chem 7: 


                                 09/22/22 16:10





                                 09/22/22 16:10


Labs: 


                             Laboratory Last Values











WBC  11.9 K/mm3 (4.5-11.0)  H  09/22/22  16:10    


 


RBC  5.02 M/mm3 (3.65-5.03)   09/22/22  16:10    


 


Hgb  15.8 gm/dl (11.8-15.2)  H  09/22/22  16:10    


 


Hct  47.6 % (35.5-45.6)  H  09/22/22  16:10    


 


MCV  95 fl (84-94)  H  09/22/22  16:10    


 


MCH  32 pg (28-32)   09/22/22  16:10    


 


MCHC  33 % (32-34)   09/22/22  16:10    


 


RDW  15.2 % (13.2-15.2)   09/22/22  16:10    


 


Plt Count  150 K/mm3 (140-440)   09/22/22  16:10    


 


Add Manual Diff  Complete   09/22/22  16:10    


 


Total Counted  100   09/22/22  16:10    


 


Seg Neutrophils %  Np   09/22/22  16:10    


 


Band Neutrophils %  0 %  09/22/22  16:10    


 


Lymphocytes % (Manual)  2.0 % (13.4-35.0)  L  09/22/22  16:10    


 


Reactive Lymphs % (Man)  0 %  09/22/22  16:10    


 


Monocytes % (Manual)  1.0 % (0.0-7.3)   09/22/22  16:10    


 


Eosinophils % (Manual)  0 % (0.0-4.3)   09/22/22  16:10    


 


Basophils % (Manual)  0 % (0.0-1.8)   09/22/22  16:10    


 


Metamyelocytes %  0 %  09/22/22  16:10    


 


Myelocytes %  0 %  09/22/22  16:10    


 


Promyelocytes %  0 %  09/22/22  16:10    


 


Blast Cells %  0 %  09/22/22  16:10    


 


Nucleated RBC %  Not Reportable   09/22/22  16:10    


 


Seg Neutrophils # Man  11.5 K/mm3 (1.8-7.7)  H  09/22/22  16:10    


 


Band Neutrophils #  0.0 K/mm3  09/22/22  16:10    


 


Lymphocytes # (Manual)  0.2 K/mm3 (1.2-5.4)  L  09/22/22  16:10    


 


Abs React Lymphs (Man)  0.0 K/mm3  09/22/22  16:10    


 


Monocytes # (Manual)  0.1 K/mm3 (0.0-0.8)   09/22/22  16:10    


 


Eosinophils # (Manual)  0.0 K/mm3 (0.0-0.4)   09/22/22  16:10    


 


Basophils # (Manual)  0.0 K/mm3 (0.0-0.1)   09/22/22  16:10    


 


Metamyelocytes #  0.0 K/mm3  09/22/22  16:10    


 


Myelocytes #  0.0 K/mm3  09/22/22  16:10    


 


Promyelocytes #  0.0 K/mm3  09/22/22  16:10    


 


Blast Cells #  0.0 K/mm3  09/22/22  16:10    


 


WBC Morphology  Not Reportable   09/22/22  16:10    


 


WBC Morphology  TNR   09/22/22  16:10    


 


Hypersegmented Neuts  Not Reportable   09/22/22  16:10    


 


Hyposegmented Neuts  Not Reportable   09/22/22  16:10    


 


Hypogranular Neuts  Not Reportable   09/22/22  16:10    


 


Smudge Cells  Not Reportable   09/22/22  16:10    


 


Toxic Granulation  Not Reportable   09/22/22  16:10    


 


Toxic Vacuolation  Not Reportable   09/22/22  16:10    


 


Dohle Bodies  Not Reportable   09/22/22  16:10    


 


Pelger-Huet Anomaly  Not Reportable   09/22/22  16:10    


 


Alejandra Rods  Not Reportable   09/22/22  16:10    


 


Platelet Estimate  Consistent w auto   09/22/22  16:10    


 


Clumped Platelets  Not Reportable   09/22/22  16:10    


 


Plt Clumps, EDTA  Not Reportable   09/22/22  16:10    


 


Large Platelets  Not Reportable   09/22/22  16:10    


 


Giant Platelets  Not Reportable   09/22/22  16:10    


 


Platelet Satelliting  Not Reportable   09/22/22  16:10    


 


Plt Morphology Comment  Not Reportable   09/22/22  16:10    


 


RBC Morphology  Normal   09/22/22  16:10    


 


Dimorphic RBCs  Not Reportable   09/22/22  16:10    


 


Polychromasia  Not Reportable   09/22/22  16:10    


 


Hypochromasia  Not Reportable   09/22/22  16:10    


 


Poikilocytosis  Not Reportable   09/22/22  16:10    


 


Anisocytosis  Not Reportable   09/22/22  16:10    


 


Microcytosis  Not Reportable   09/22/22  16:10    


 


Macrocytosis  Not Reportable   09/22/22  16:10    


 


Spherocytes  Not Reportable   09/22/22  16:10    


 


Pappenheimer Bodies  Not Reportable   09/22/22  16:10    


 


Sickle Cells  Not Reportable   09/22/22  16:10    


 


Target Cells  Not Reportable   09/22/22  16:10    


 


Tear Drop Cells  Not Reportable   09/22/22  16:10    


 


Ovalocytes  Not Reportable   09/22/22  16:10    


 


Helmet Cells  Not Reportable   09/22/22  16:10    


 


Cameron-Zurich Bodies  Not Reportable   09/22/22  16:10    


 


Cabot Rings  Not Reportable   09/22/22  16:10    


 


Ruben Cells  Not Reportable   09/22/22  16:10    


 


Bite Cells  Not Reportable   09/22/22  16:10    


 


Crenated Cell  Not Reportable   09/22/22  16:10    


 


Elliptocytes  Not Reportable   09/22/22  16:10    


 


Acanthocytes (Spur)  Not Reportable   09/22/22  16:10    


 


Rouleaux  Not Reportable   09/22/22  16:10    


 


Hemoglobin C Crystals  Not Reportable   09/22/22  16:10    


 


Schistocytes  Not Reportable   09/22/22  16:10    


 


Malaria parasites  Not Reportable   09/22/22  16:10    


 


Anish Bodies  Not Reportable   09/22/22  16:10    


 


Hem Pathologist Commnt  No   09/22/22  16:10    


 


Sodium  141 mmol/L (137-145)   09/22/22  16:10    


 


Potassium  4.2 mmol/L (3.6-5.0)   09/22/22  16:10    


 


Chloride  109.3 mmol/L ()  H  09/22/22  16:10    


 


Carbon Dioxide  18 mmol/L (22-30)  L  09/22/22  16:10    


 


Anion Gap  18 mmol/L  09/22/22  16:10    


 


BUN  8 mg/dL (9-20)  L  09/22/22  16:10    


 


Creatinine  0.6 mg/dL (0.8-1.3)  L  09/22/22  16:10    


 


Estimated GFR  > 60 ml/min  09/22/22  16:10    


 


BUN/Creatinine Ratio  13 %  09/22/22  16:10    


 


Glucose  106 mg/dL ()  H  09/22/22  16:10    


 


Calcium  8.5 mg/dL (8.4-10.2)   09/22/22  16:10    


 


Total Bilirubin  0.40 mg/dL (0.1-1.2)   09/22/22  16:10    


 


AST  14 units/L (5-40)   09/22/22  16:10    


 


ALT  11 units/L (7-56)   09/22/22  16:10    


 


Alkaline Phosphatase  98 units/L ()   09/22/22  16:10    


 


Total Protein  5.6 g/dL (6.3-8.2)  L  09/22/22  16:10    


 


Albumin  3.8 g/dL (3.9-5)  L  09/22/22  16:10    


 


Albumin/Globulin Ratio  2.1 %  09/22/22  16:10    











Echols/IV: 


                                        





Voiding Method                   Urinal











Assessment and Plan


Advance Directives: Yes (Full code)


VTE prophylaxis?: Chemical


Plan of care discussed with patient/family: Yes





- Patient Problems


(1) Colovesical fistula


Current Visit: Yes   Status: Acute   


Plan to address problem: 


Surgically corrected


Postop observation








(2) Hypertension


Current Visit: Yes   Status: Chronic   


Qualifiers: 


   Hypertension type: primary hypertension   Qualified Code(s): I10 - Essential 

(primary) hypertension   


Plan to address problem: 


Hold antihypertensives for now


Resume when the blood pressure goes up


The patient is n.p.o.








(3) GERD (gastroesophageal reflux disease)


Current Visit: Yes   Status: Chronic   


Qualifiers: 


   Esophagitis presence: without esophagitis   Qualified Code(s): K21.9 - 

Gastro-esophageal reflux disease without esophagitis   


Plan to address problem: 


IV Protonix








(4) Hyperlipidemia


Current Visit: Yes   Status: Chronic   


Qualifiers: 


   Hyperlipidemia type: mixed hyperlipidemia   Qualified Code(s): E78.2 - Mixed 

hyperlipidemia   


Plan to address problem: 


Hold statins


Patient is n.p.o.








(5) DVT prophylaxis


Current Visit: Yes   Status: Acute   


Plan to address problem: 


On heparin and GI prophylaxis








(6) Advance care planning


Current Visit: Yes   Status: Acute   


Plan to address problem: 


Disease education conducted, care plan discussed, diagnosis and prognosis 

discussed.  Patient is full code.  Patient acknowledged understanding with care 

plan.  +30 minutes.

## 2022-09-24 RX ADMIN — DEXTROSE SCH MLS/HR: 5 SOLUTION INTRAVENOUS at 18:21

## 2022-09-24 RX ADMIN — Medication SCH ML: at 09:46

## 2022-09-24 RX ADMIN — HYDROMORPHONE HYDROCHLORIDE PRN MG: 1 INJECTION, SOLUTION INTRAMUSCULAR; INTRAVENOUS; SUBCUTANEOUS at 13:09

## 2022-09-24 RX ADMIN — DEXTROSE SCH MLS/HR: 5 SOLUTION INTRAVENOUS at 07:00

## 2022-09-24 RX ADMIN — FAMOTIDINE SCH MG: 10 INJECTION, SOLUTION INTRAVENOUS at 21:03

## 2022-09-24 RX ADMIN — Medication SCH ML: at 21:03

## 2022-09-24 RX ADMIN — HYDROMORPHONE HYDROCHLORIDE PRN MG: 1 INJECTION, SOLUTION INTRAMUSCULAR; INTRAVENOUS; SUBCUTANEOUS at 20:52

## 2022-09-24 RX ADMIN — ZOLPIDEM TARTRATE PRN MG: 5 TABLET ORAL at 22:53

## 2022-09-24 RX ADMIN — HEPARIN SODIUM SCH UNIT: 5000 INJECTION, SOLUTION INTRAVENOUS; SUBCUTANEOUS at 09:45

## 2022-09-24 RX ADMIN — HEPARIN SODIUM SCH UNIT: 5000 INJECTION, SOLUTION INTRAVENOUS; SUBCUTANEOUS at 21:16

## 2022-09-24 RX ADMIN — CEFEPIME SCH: 2 INJECTION, POWDER, FOR SOLUTION INTRAVENOUS at 09:45

## 2022-09-24 RX ADMIN — HYDROMORPHONE HYDROCHLORIDE PRN MG: 1 INJECTION, SOLUTION INTRAMUSCULAR; INTRAVENOUS; SUBCUTANEOUS at 06:58

## 2022-09-24 RX ADMIN — FAMOTIDINE SCH MG: 10 INJECTION, SOLUTION INTRAVENOUS at 09:45

## 2022-09-24 NOTE — CAT SCAN REPORT
CT ABDOMEN AND PELVIS WITH CONTRAST



INDICATION / CLINICAL INFORMATION:

abdominal pain s/p colonoscopy.



TECHNIQUE:

Axial CT images were obtained through the abdomen and pelvis after 100 cc Omnipaque 300 IV contrast. 
 All CT scans at this location are performed using CT dose reduction for ALARA by means of automated 
exposure control. 



COMPARISON:

Abdominal radiographs from 9/22/2022. CT abdomen and pelvis without contrast from 4/24/2018.



FINDINGS:



LOWER CHEST: There is bibasilar atelectasis. Moderate coronary artery calcification is noted. No othe
r significant abnormality.

LIVER: No significant abnormality.

GALLBLADDER: No significant abnormality. 

BILE DUCTS: No significant abnormality. 

PANCREAS: No significant abnormality.

SPLEEN: There is an unchanged upper pole cyst measuring 2.3 cm. No other significant abnormality.

ADRENALS: No significant abnormality.

RIGHT KIDNEY/URETER: No significant abnormality. 

LEFT KIDNEY/URETER: Simple appearing left renal cysts have not significantly changed, measuring up to
 5.6 cm. No other significant abnormality.



STOMACH/SMALL BOWEL: Multiple mildly thickened ileal loops are seen along the right lower quadrant wi
th mild surrounding fat stranding. No other significant abnormality. 

COLON: Moderate distention of the colon is compatible with recent colonoscopy. There is noninflamed s
igmoid diverticulosis. No other significant abnormality. 

APPENDIX: No significant abnormality.  

PERITONEUM: No free fluid. No free air. No fluid collection.

LYMPH NODES: No significant adenopathy.

VASCULATURE: There is mild generalized atherosclerosis. No other significant abnormality. 



URINARY BLADDER: A small amount of gas is seen along the bladder dome and more anteriorly and inferio
rly along the bladder, possibly related to recent catheterization. No other significant abnormality.

REPRODUCTIVE ORGANS: The prostate gland is mildly enlarged and calcified. No other significant abnorm
ality.



ADDITIONAL FINDINGS: None.



BONES: No acute findings. There is mild spondylosis.



IMPRESSION:

1. Evidence of uncomplicated mild ileitis without other acute findings and when the patient's abdomin
al pain.

2. Moderate colonic distention is expected in the setting of recent colonoscopy.

3. Additional findings as above.



Signer Name: Frankie Aggarwal MD 

Signed: 9/24/2022 1:00 PM

Workstation Name: [id number]

## 2022-09-24 NOTE — PROGRESS NOTE
Assessment and Plan





61-year-old male 2 days status post colonoscopy for evaluation of chronic 

colovesicular fistula due to chronic diverticular disease.  Patient was admitted

after colonoscopy for abdominal pain.  Patient is afebrile and stable, no 

leukocytosis with subjective mild improvement in abdominal pain.  Since pain 

fairly significant we will order CT scan of the abdomen and pelvis.





Subjective


Date of service: 09/24/22


Narrative: 





No acute events overnight.  Patient says that his pain is slightly improved 

compared to yesterday.  He has been able to ambulate and he is tolerating 

liquids but does not care for was being brought on his tray and sometimes they 

feel that it causes cramping.  He denies any fevers chills or nausea or 

vomiting.  He denies any dysuria.





Objective


                               Vital Signs - 12hr











  09/23/22 09/24/22 09/24/22





  23:29 01:23 04:18


 


Temperature 99.0 F  98.7 F


 


Pulse Rate 60  59 L


 


Respiratory 18  18





Rate   


 


Blood Pressure 121/72  123/70


 


O2 Sat by Pulse 94 98 97





Oximetry   














- General physical appearance


well developed, no distress, moderate pain, obese





- Eyes


PERRL





- Respiratory


normal expansion, normal respiratory effort





- Abdomen


soft, not rebound, not guarding, not rigid, other (generalized tenderness to 

palpation)





- Labs





                                 09/23/22 10:46





                                 09/23/22 10:46


                                 Diabetes panel











  09/23/22 Range/Units





  10:46 


 


Sodium  141  (137-145)  mmol/L


 


Potassium  4.2  (3.6-5.0)  mmol/L


 


Chloride  105.4  ()  mmol/L


 


Carbon Dioxide  24  (22-30)  mmol/L


 


BUN  13  (9-20)  mg/dL


 


Creatinine  0.7 L  (0.8-1.3)  mg/dL


 


Glucose  125 H  ()  mg/dL


 


Calcium  8.9  (8.4-10.2)  mg/dL








                                  Calcium panel











  09/23/22 Range/Units





  10:46 


 


Calcium  8.9  (8.4-10.2)  mg/dL








                                 Pituitary panel











  09/23/22 Range/Units





  10:46 


 


Sodium  141  (137-145)  mmol/L


 


Potassium  4.2  (3.6-5.0)  mmol/L


 


Chloride  105.4  ()  mmol/L


 


Carbon Dioxide  24  (22-30)  mmol/L


 


BUN  13  (9-20)  mg/dL


 


Creatinine  0.7 L  (0.8-1.3)  mg/dL


 


Glucose  125 H  ()  mg/dL


 


Calcium  8.9  (8.4-10.2)  mg/dL








                                  Adrenal panel











  09/23/22 Range/Units





  10:46 


 


Sodium  141  (137-145)  mmol/L


 


Potassium  4.2  (3.6-5.0)  mmol/L


 


Chloride  105.4  ()  mmol/L


 


Carbon Dioxide  24  (22-30)  mmol/L


 


BUN  13  (9-20)  mg/dL


 


Creatinine  0.7 L  (0.8-1.3)  mg/dL


 


Glucose  125 H  ()  mg/dL


 


Calcium  8.9  (8.4-10.2)  mg/dL

## 2022-09-24 NOTE — PROGRESS NOTE
Assessment and Plan


Assessment and plan: 








#Colovesicular fistula


 Status post repair by general surgery on 9/22/2022


 General surgery consulted; appreciate recs


 Continue antiemetics and analgesics as needed.  Continue clear liquid diet.





#Hypertension


#Hyperlipidemia


- home medications: Amlodipine 5 mg daily and atorvastatin 40 mg daily


- current medications: Amlodipine 5 mg daily and atorvastatin 40 mg daily


- SBP goal <160 and DBP goal <90 while inpatient


- continue to monitor





#GERD


Continue IV Pepcid 20 mg twice daily.  Can transition to home omeprazole 40 mg 

daily when patient can tolerate p.o. intake.





#Obesity


#Weight loss counseling


#Exercise counseling


- BMI 34.2


- Counseled patient on the importance of weight loss, incorporating exercise, 

and dietary changes (lean meats, fresh fruits and vegetables, and water intake).

Patient expresses understanding. 


- Time: +15 min





#Advanced care planning


-Disease education conducted, care plan discussed, diagnoses discussed, 

prognosis discussed, and patient acknowledges understanding with care plan


-Time: +30 min


Disposition Plan: Continue medical management


Total Time Spent with Patient (Minutes): 45 minutes





History


Interval history: 





No acute events overnight.





Hospitalist Physical





- Constitutional


Vitals: 


                                        











Temp Pulse Resp BP Pulse Ox


 


 98.2 F   60   18   123/75   92 


 


 09/24/22 11:35  09/24/22 11:35  09/24/22 11:35  09/24/22 11:35  09/24/22 11:35











General appearance: Present: mild distress, well-nourished, obese





- EENT


Eyes: Present: PERRL, EOM intact


ENT: hearing intact, clear oral mucosa, dentition normal





- Neck


Neck: Present: supple, normal ROM





- Respiratory


Respiratory effort: normal


Respiratory: bilateral: CTA





- Cardiovascular


Rhythm: regular


Heart Sounds: Present: S1 & S2





- Extremities


Extremities: no ischemia, pulses intact, pulses symmetrical, No edema, normal 

temperature, normal color, Full ROM


Peripheral Pulses: within normal limits





- Abdominal


General gastrointestinal: soft, tender, non-distended, normal bowel sounds


Localized gastrointestinal: tender: epigastric periumbilical (Mostly crampy 

symptoms)





- Integumentary


Integumentary: Present: clear, warm, dry





- Psychiatric


Psychiatric: appropriate mood/affect, intact judgment & insight, memory intact, 

cooperative





- Neurologic


Neurologic: CNII-XII intact, moves all extremities





- Allied Health


Allied health notes reviewed: nursing





Results





- Labs


CBC & Chem 7: 


                                 09/23/22 10:46





                                 09/23/22 10:46


Labs: 


                             Laboratory Last Values











WBC  9.0 K/mm3 (4.5-11.0)   09/23/22  10:46    


 


RBC  4.93 M/mm3 (3.65-5.03)   09/23/22  10:46    


 


Hgb  15.1 gm/dl (11.8-15.2)   09/23/22  10:46    


 


Hct  45.7 % (35.5-45.6)  H  09/23/22  10:46    


 


MCV  93 fl (84-94)   09/23/22  10:46    


 


MCH  31 pg (28-32)   09/23/22  10:46    


 


MCHC  33 % (32-34)   09/23/22  10:46    


 


RDW  15.5 % (13.2-15.2)  H  09/23/22  10:46    


 


Plt Count  156 K/mm3 (140-440)   09/23/22  10:46    


 


Lymph % (Auto)  11.8 % (13.4-35.0)  L  09/23/22  10:46    


 


Mono % (Auto)  7.8 % (0.0-7.3)  H  09/23/22  10:46    


 


Eos % (Auto)  0.1 % (0.0-4.3)   09/23/22  10:46    


 


Baso % (Auto)  0.2 % (0.0-1.8)   09/23/22  10:46    


 


Lymph # (Auto)  1.1 K/mm3 (1.2-5.4)  L  09/23/22  10:46    


 


Mono # (Auto)  0.7 K/mm3 (0.0-0.8)   09/23/22  10:46    


 


Eos # (Auto)  0.0 K/mm3 (0.0-0.4)   09/23/22  10:46    


 


Baso # (Auto)  0.0 K/mm3 (0.0-0.1)   09/23/22  10:46    


 


Add Manual Diff  Complete   09/22/22  16:10    


 


Total Counted  100   09/22/22  16:10    


 


Seg Neutrophils %  80.1 % (40.0-70.0)  H  09/23/22  10:46    


 


Band Neutrophils %  0 %  09/22/22  16:10    


 


Lymphocytes % (Manual)  2.0 % (13.4-35.0)  L  09/22/22  16:10    


 


Reactive Lymphs % (Man)  0 %  09/22/22  16:10    


 


Monocytes % (Manual)  1.0 % (0.0-7.3)   09/22/22  16:10    


 


Eosinophils % (Manual)  0 % (0.0-4.3)   09/22/22  16:10    


 


Basophils % (Manual)  0 % (0.0-1.8)   09/22/22  16:10    


 


Metamyelocytes %  0 %  09/22/22  16:10    


 


Myelocytes %  0 %  09/22/22  16:10    


 


Promyelocytes %  0 %  09/22/22  16:10    


 


Blast Cells %  0 %  09/22/22  16:10    


 


Nucleated RBC %  Not Reportable   09/22/22  16:10    


 


Seg Neutrophils #  7.2 K/mm3 (1.8-7.7)   09/23/22  10:46    


 


Seg Neutrophils # Man  11.5 K/mm3 (1.8-7.7)  H  09/22/22  16:10    


 


Band Neutrophils #  0.0 K/mm3  09/22/22  16:10    


 


Lymphocytes # (Manual)  0.2 K/mm3 (1.2-5.4)  L  09/22/22  16:10    


 


Abs React Lymphs (Man)  0.0 K/mm3  09/22/22  16:10    


 


Monocytes # (Manual)  0.1 K/mm3 (0.0-0.8)   09/22/22  16:10    


 


Eosinophils # (Manual)  0.0 K/mm3 (0.0-0.4)   09/22/22  16:10    


 


Basophils # (Manual)  0.0 K/mm3 (0.0-0.1)   09/22/22  16:10    


 


Metamyelocytes #  0.0 K/mm3  09/22/22  16:10    


 


Myelocytes #  0.0 K/mm3  09/22/22  16:10    


 


Promyelocytes #  0.0 K/mm3  09/22/22  16:10    


 


Blast Cells #  0.0 K/mm3  09/22/22  16:10    


 


WBC Morphology  Not Reportable   09/22/22  16:10    


 


WBC Morphology  TNR   09/22/22  16:10    


 


Hypersegmented Neuts  Not Reportable   09/22/22  16:10    


 


Hyposegmented Neuts  Not Reportable   09/22/22  16:10    


 


Hypogranular Neuts  Not Reportable   09/22/22  16:10    


 


Smudge Cells  Not Reportable   09/22/22  16:10    


 


Toxic Granulation  Not Reportable   09/22/22  16:10    


 


Toxic Vacuolation  Not Reportable   09/22/22  16:10    


 


Dohle Bodies  Not Reportable   09/22/22  16:10    


 


Pelger-Huet Anomaly  Not Reportable   09/22/22  16:10    


 


Alejandra Rods  Not Reportable   09/22/22  16:10    


 


Platelet Estimate  Consistent w auto   09/22/22  16:10    


 


Clumped Platelets  Not Reportable   09/22/22  16:10    


 


Plt Clumps, EDTA  Not Reportable   09/22/22  16:10    


 


Large Platelets  Not Reportable   09/22/22  16:10    


 


Giant Platelets  Not Reportable   09/22/22  16:10    


 


Platelet Satelliting  Not Reportable   09/22/22  16:10    


 


Plt Morphology Comment  Not Reportable   09/22/22  16:10    


 


RBC Morphology  Normal   09/22/22  16:10    


 


Dimorphic RBCs  Not Reportable   09/22/22  16:10    


 


Polychromasia  Not Reportable   09/22/22  16:10    


 


Hypochromasia  Not Reportable   09/22/22  16:10    


 


Poikilocytosis  Not Reportable   09/22/22  16:10    


 


Anisocytosis  Not Reportable   09/22/22  16:10    


 


Microcytosis  Not Reportable   09/22/22  16:10    


 


Macrocytosis  Not Reportable   09/22/22  16:10    


 


Spherocytes  Not Reportable   09/22/22  16:10    


 


Pappenheimer Bodies  Not Reportable   09/22/22  16:10    


 


Sickle Cells  Not Reportable   09/22/22  16:10    


 


Target Cells  Not Reportable   09/22/22  16:10    


 


Tear Drop Cells  Not Reportable   09/22/22  16:10    


 


Ovalocytes  Not Reportable   09/22/22  16:10    


 


Helmet Cells  Not Reportable   09/22/22  16:10    


 


Camer[place] Bodies  Not Reportable   09/22/22  16:10    


 


Cabot Rings  Not Reportable   09/22/22  16:10    


 


[place] Cells  Not Reportable   09/22/22  16:10    


 


Bite Cells  Not Reportable   09/22/22  16:10    


 


Crenated Cell  Not Reportable   09/22/22  16:10    


 


Elliptocytes  Not Reportable   09/22/22  16:10    


 


Acanthocytes (Spur)  Not Reportable   09/22/22  16:10    


 


Rouleaux  Not Reportable   09/22/22  16:10    


 


Hemoglobin C Crystals  Not Reportable   09/22/22  16:10    


 


Schistocytes  Not Reportable   09/22/22  16:10    


 


Malaria parasites  Not Reportable   09/22/22  16:10    


 


Anish Bodies  Not Reportable   09/22/22  16:10    


 


Hem Pathologist Commnt  No   09/22/22  16:10    


 


Sodium  141 mmol/L (137-145)   09/23/22  10:46    


 


Potassium  4.2 mmol/L (3.6-5.0)   09/23/22  10:46    


 


Chloride  105.4 mmol/L ()   09/23/22  10:46    


 


Carbon Dioxide  24 mmol/L (22-30)   09/23/22  10:46    


 


Anion Gap  16 mmol/L  09/23/22  10:46    


 


BUN  13 mg/dL (9-20)   09/23/22  10:46    


 


Creatinine  0.7 mg/dL (0.8-1.3)  L  09/23/22  10:46    


 


Estimated GFR  > 60 ml/min  09/23/22  10:46    


 


BUN/Creatinine Ratio  19 %  09/23/22  10:46    


 


Glucose  125 mg/dL ()  H  09/23/22  10:46    


 


Calcium  8.9 mg/dL (8.4-10.2)   09/23/22  10:46    


 


Total Bilirubin  0.40 mg/dL (0.1-1.2)   09/22/22  16:10    


 


AST  14 units/L (5-40)   09/22/22  16:10    


 


ALT  11 units/L (7-56)   09/22/22  16:10    


 


Alkaline Phosphatase  98 units/L ()   09/22/22  16:10    


 


Total Protein  5.6 g/dL (6.3-8.2)  L  09/22/22  16:10    


 


Albumin  3.8 g/dL (3.9-5)  L  09/22/22  16:10    


 


Albumin/Globulin Ratio  2.1 %  09/22/22  16:10    











Echols/IV: 


                                        





Voiding Method                   Urinal











Active Medications





- Current Medications


Current Medications: 














Generic Name Dose Route Start Last Admin





  Trade Name Freq  PRN Reason Stop Dose Admin


 


Acetaminophen  650 mg  09/22/22 14:10 





  Acetaminophen 325 Mg Tab  PO  





  Q4H PRN  





  Pain MILD(1-3)/Fever >100.5/HA  


 


Famotidine  20 mg  09/22/22 15:00  09/24/22 09:45





  Famotidine 20 Mg/2 Ml Inj  IV   20 mg





  BID RYLEY   Administration


 


Heparin Sodium (Porcine)  5,000 unit  09/22/22 14:15  09/24/22 09:45





  Heparin 5,000 Unit/1 Ml Vial  SUB-Q   5,000 unit





  Q12HR RYLEY   Administration


 


Hydromorphone HCl  1 mg  09/23/22 13:00  09/24/22 13:09





  Hydromorphone 1 Mg/1 Ml Inj  IV   1 mg





  Q4H PRN   Administration





  Pain , Severe (7-10)  


 


Dextrose  1,000 mls @ 75 mls/hr  09/23/22 08:00  09/24/22 07:00





  D5w  IV   75 mls/hr





  AS DIRECT RYLEY   Administration


 


Morphine Sulfate  2 mg  09/22/22 14:10  09/22/22 19:03





  Morphine 2 Mg/1 Ml Inj  IV   2 mg





  Q4H PRN   Administration





  Pain, Moderate (4-6)  


 


Ondansetron HCl  4 mg  09/22/22 14:10  09/22/22 22:55





  Ondansetron 4 Mg/2 Ml Inj  IV   4 mg





  Q8H PRN   Administration





  Nausea And Vomiting  


 


Sodium Chloride  10 ml  09/22/22 22:00  09/24/22 09:46





  Sodium Chloride 0.9% 10 Ml Flush Syringe  IV   10 ml





  BID RYLEY   Administration


 


Sodium Chloride  10 ml  09/22/22 14:10 





  Sodium Chloride 0.9% 10 Ml Flush Syringe  IV  





  PRN PRN  





  LINE FLUSH  


 


Zolpidem Tartrate  5 mg  09/22/22 22:36  09/23/22 22:17





  Zolpidem 5 Mg Tab  PO   5 mg





  QHS PRN   Administration





  Sleep

## 2022-09-25 RX ADMIN — HYDROMORPHONE HYDROCHLORIDE PRN MG: 1 INJECTION, SOLUTION INTRAMUSCULAR; INTRAVENOUS; SUBCUTANEOUS at 03:19

## 2022-09-25 RX ADMIN — HYDROMORPHONE HYDROCHLORIDE PRN MG: 1 INJECTION, SOLUTION INTRAMUSCULAR; INTRAVENOUS; SUBCUTANEOUS at 21:53

## 2022-09-25 RX ADMIN — HEPARIN SODIUM SCH UNIT: 5000 INJECTION, SOLUTION INTRAVENOUS; SUBCUTANEOUS at 21:36

## 2022-09-25 RX ADMIN — HYDROMORPHONE HYDROCHLORIDE PRN MG: 1 INJECTION, SOLUTION INTRAMUSCULAR; INTRAVENOUS; SUBCUTANEOUS at 11:34

## 2022-09-25 RX ADMIN — ZOLPIDEM TARTRATE PRN MG: 5 TABLET ORAL at 23:56

## 2022-09-25 RX ADMIN — HYDROMORPHONE HYDROCHLORIDE PRN MG: 1 INJECTION, SOLUTION INTRAMUSCULAR; INTRAVENOUS; SUBCUTANEOUS at 17:23

## 2022-09-25 RX ADMIN — Medication SCH ML: at 11:44

## 2022-09-25 RX ADMIN — FAMOTIDINE SCH MG: 10 INJECTION, SOLUTION INTRAVENOUS at 11:43

## 2022-09-25 RX ADMIN — Medication SCH ML: at 21:36

## 2022-09-25 RX ADMIN — DEXTROSE SCH MLS/HR: 5 SOLUTION INTRAVENOUS at 07:57

## 2022-09-25 RX ADMIN — FAMOTIDINE SCH MG: 10 INJECTION, SOLUTION INTRAVENOUS at 21:36

## 2022-09-25 RX ADMIN — HEPARIN SODIUM SCH UNIT: 5000 INJECTION, SOLUTION INTRAVENOUS; SUBCUTANEOUS at 11:43

## 2022-09-25 RX ADMIN — KETOROLAC TROMETHAMINE SCH MG: 30 INJECTION, SOLUTION INTRAMUSCULAR at 18:22

## 2022-09-25 NOTE — PROGRESS NOTE
Assessment and Plan





61-year-old male 3 days status post colonoscopy for evaluation of chronic 

colovesicular fistula due to chronic diverticular disease.  Patient was admitted

after colonoscopy for abdominal pain.  Patient is afebrile and stable, no 

leukocytosis with subjective mild improvement in abdominal pain. 


Unsure etiology of acute onset of abdominal pain after colonoscopy.  May be due 

to inflammation with a history of chronic diverticulitis and inflammatory 

disease.  Will start on Toradol and advance to full liquids.  The patient can be

discharged next 24 hours.





Subjective


Date of service: 09/25/22


Narrative: 





No acute events overnight.  Patient had a CT scan of the abdomen pelvis 

yesterday that was essentially negative for any acute pathology with the 

exception of inflammation of some loops of ileum.  Patient says his pain is 

little bit better compared to yesterday and he would like to slowly advance his 

diet.  He still has pain requiring pain medication.  He denies any nausea or 

vomiting.





Objective


                               Vital Signs - 12hr











  09/24/22 09/25/22 09/25/22





  23:00 03:19 03:49


 


Temperature   


 


Pulse Rate   


 


Respiratory  18 17





Rate   


 


Blood Pressure   


 


O2 Sat by Pulse 96  





Oximetry   














  09/25/22





  05:46


 


Temperature 97.9 F


 


Pulse Rate 55 L


 


Respiratory 18





Rate 


 


Blood Pressure 117/63


 


O2 Sat by Pulse 93





Oximetry 














- General physical appearance


well developed, no distress, moderate pain





- Eyes


PERRL





- ENT


no hearing loss





- Respiratory


normal expansion, normal respiratory effort





- Abdomen


soft, distended, not guarding, not rigid, other (Generalized tenderness to deep 

palpation)





- Labs





                                 09/23/22 10:46





                                 09/23/22 10:46





- Imaging


CT scan - abdomen: report reviewed, image reviewed


CT scan - pelvis: report reviewed, image reviewed

## 2022-09-25 NOTE — PROGRESS NOTE
Assessment and Plan


Assessment and plan: 








#Colovesicular fistula


 Status post repair by general surgery on 9/22/2022


 General surgery consulted; appreciate recs


 Continue antiemetics and analgesics as needed.  Transition to full liquid 

diet. 





#Hypertension


#Hyperlipidemia


- home medications: Amlodipine 5 mg daily and atorvastatin 40 mg daily


- current medications: Amlodipine 5 mg daily and atorvastatin 40 mg daily


- SBP goal <160 and DBP goal <90 while inpatient


- continue to monitor





#GERD


Continue IV Pepcid 20 mg twice daily.  Can transition to home omeprazole 40 mg 

daily when patient can tolerate p.o. intake.





#Obesity


#Weight loss counseling


#Exercise counseling


- BMI 34.2


- Counseled patient on the importance of weight loss, incorporating exercise, 

and dietary changes (lean meats, fresh fruits and vegetables, and water intake).

Patient expresses understanding. 


- Time: +15 min





#Advanced care planning


-Disease education conducted, care plan discussed, diagnoses discussed, 

prognosis discussed, and patient acknowledges understanding with care plan


-Time: +30 min


Disposition Plan: Pending discharge tomorrow


Total Time Spent with Patient (Minutes): 45 min





History


Interval history: 





No acute events overnight.





Hospitalist Physical





- Constitutional


Vitals: 


                                        











Temp Pulse Resp BP Pulse Ox


 


 97.9 F   55 L  18   117/63   93 


 


 09/25/22 05:46  09/25/22 05:46  09/25/22 05:46  09/25/22 05:46  09/25/22 05:46











General appearance: Present: mild distress, well-nourished, obese





- EENT


Eyes: Present: PERRL, EOM intact


ENT: hearing intact, clear oral mucosa, dentition normal





- Neck


Neck: Present: supple, normal ROM





- Respiratory


Respiratory effort: normal


Respiratory: bilateral: CTA





- Cardiovascular


Rhythm: regular


Heart Sounds: Present: S1 & S2





- Extremities


Extremities: no ischemia, pulses intact, pulses symmetrical, No edema, normal 

temperature, normal color


Peripheral Pulses: within normal limits





- Abdominal


General gastrointestinal: soft, tender, non-distended, normal bowel sounds


Localized gastrointestinal: tender: epigastric periumbilical





- Integumentary


Integumentary: Present: clear, warm, dry





- Psychiatric


Psychiatric: appropriate mood/affect, intact judgment & insight, memory intact, 

cooperative





- Neurologic


Neurologic: CNII-XII intact, moves all extremities





- Allied Health


Allied health notes reviewed: nursing





Results





- Labs


CBC & Chem 7: 


                                 09/23/22 10:46





                                 09/23/22 10:46


Labs: 


                             Laboratory Last Values











WBC  9.0 K/mm3 (4.5-11.0)   09/23/22  10:46    


 


RBC  4.93 M/mm3 (3.65-5.03)   09/23/22  10:46    


 


Hgb  15.1 gm/dl (11.8-15.2)   09/23/22  10:46    


 


Hct  45.7 % (35.5-45.6)  H  09/23/22  10:46    


 


MCV  93 fl (84-94)   09/23/22  10:46    


 


MCH  31 pg (28-32)   09/23/22  10:46    


 


MCHC  33 % (32-34)   09/23/22  10:46    


 


RDW  15.5 % (13.2-15.2)  H  09/23/22  10:46    


 


Plt Count  156 K/mm3 (140-440)   09/23/22  10:46    


 


Lymph % (Auto)  11.8 % (13.4-35.0)  L  09/23/22  10:46    


 


Mono % (Auto)  7.8 % (0.0-7.3)  H  09/23/22  10:46    


 


Eos % (Auto)  0.1 % (0.0-4.3)   09/23/22  10:46    


 


Baso % (Auto)  0.2 % (0.0-1.8)   09/23/22  10:46    


 


Lymph # (Auto)  1.1 K/mm3 (1.2-5.4)  L  09/23/22  10:46    


 


Mono # (Auto)  0.7 K/mm3 (0.0-0.8)   09/23/22  10:46    


 


Eos # (Auto)  0.0 K/mm3 (0.0-0.4)   09/23/22  10:46    


 


Baso # (Auto)  0.0 K/mm3 (0.0-0.1)   09/23/22  10:46    


 


Add Manual Diff  Complete   09/22/22  16:10    


 


Total Counted  100   09/22/22  16:10    


 


Seg Neutrophils %  80.1 % (40.0-70.0)  H  09/23/22  10:46    


 


Band Neutrophils %  0 %  09/22/22  16:10    


 


Lymphocytes % (Manual)  2.0 % (13.4-35.0)  L  09/22/22  16:10    


 


Reactive Lymphs % (Man)  0 %  09/22/22  16:10    


 


Monocytes % (Manual)  1.0 % (0.0-7.3)   09/22/22  16:10    


 


Eosinophils % (Manual)  0 % (0.0-4.3)   09/22/22  16:10    


 


Basophils % (Manual)  0 % (0.0-1.8)   09/22/22  16:10    


 


Metamyelocytes %  0 %  09/22/22  16:10    


 


Myelocytes %  0 %  09/22/22  16:10    


 


Promyelocytes %  0 %  09/22/22  16:10    


 


Blast Cells %  0 %  09/22/22  16:10    


 


Nucleated RBC %  Not Reportable   09/22/22  16:10    


 


Seg Neutrophils #  7.2 K/mm3 (1.8-7.7)   09/23/22  10:46    


 


Seg Neutrophils # Man  11.5 K/mm3 (1.8-7.7)  H  09/22/22  16:10    


 


Band Neutrophils #  0.0 K/mm3  09/22/22  16:10    


 


Lymphocytes # (Manual)  0.2 K/mm3 (1.2-5.4)  L  09/22/22  16:10    


 


Abs React Lymphs (Man)  0.0 K/mm3  09/22/22  16:10    


 


Monocytes # (Manual)  0.1 K/mm3 (0.0-0.8)   09/22/22  16:10    


 


Eosinophils # (Manual)  0.0 K/mm3 (0.0-0.4)   09/22/22  16:10    


 


Basophils # (Manual)  0.0 K/mm3 (0.0-0.1)   09/22/22  16:10    


 


Metamyelocytes #  0.0 K/mm3  09/22/22  16:10    


 


Myelocytes #  0.0 K/mm3  09/22/22  16:10    


 


Promyelocytes #  0.0 K/mm3  09/22/22  16:10    


 


Blast Cells #  0.0 K/mm3  09/22/22  16:10    


 


WBC Morphology  Not Reportable   09/22/22  16:10    


 


WBC Morphology  TNR   09/22/22  16:10    


 


Hypersegmented Neuts  Not Reportable   09/22/22  16:10    


 


Hyposegmented Neuts  Not Reportable   09/22/22  16:10    


 


Hypogranular Neuts  Not Reportable   09/22/22  16:10    


 


Smudge Cells  Not Reportable   09/22/22  16:10    


 


Toxic Granulation  Not Reportable   09/22/22  16:10    


 


Toxic Vacuolation  Not Reportable   09/22/22  16:10    


 


Dohle Bodies  Not Reportable   09/22/22  16:10    


 


Pelger-Huet Anomaly  Not Reportable   09/22/22  16:10    


 


Alejandra Rods  Not Reportable   09/22/22  16:10    


 


Platelet Estimate  Consistent w auto   09/22/22  16:10    


 


Clumped Platelets  Not Reportable   09/22/22  16:10    


 


Plt Clumps, EDTA  Not Reportable   09/22/22  16:10    


 


Large Platelets  Not Reportable   09/22/22  16:10    


 


Giant Platelets  Not Reportable   09/22/22  16:10    


 


Platelet Satelliting  Not Reportable   09/22/22  16:10    


 


Plt Morphology Comment  Not Reportable   09/22/22  16:10    


 


RBC Morphology  Normal   09/22/22  16:10    


 


Dimorphic RBCs  Not Reportable   09/22/22  16:10    


 


Polychromasia  Not Reportable   09/22/22  16:10    


 


Hypochromasia  Not Reportable   09/22/22  16:10    


 


Poikilocytosis  Not Reportable   09/22/22  16:10    


 


Anisocytosis  Not Reportable   09/22/22  16:10    


 


Microcytosis  Not Reportable   09/22/22  16:10    


 


Macrocytosis  Not Reportable   09/22/22  16:10    


 


Spherocytes  Not Reportable   09/22/22  16:10    


 


Pappenheimer Bodies  Not Reportable   09/22/22  16:10    


 


Sickle Cells  Not Reportable   09/22/22  16:10    


 


Target Cells  Not Reportable   09/22/22  16:10    


 


Tear Drop Cells  Not Reportable   09/22/22  16:10    


 


Ovalocytes  Not Reportable   09/22/22  16:10    


 


Helmet Cells  Not Reportable   09/22/22  16:10    


 


[place] Bodies  Not Reportable   09/22/22  16:10    


 


Cabot Rings  Not Reportable   09/22/22  16:10    


 


[place] Cells  Not Reportable   09/22/22  16:10    


 


Bite Cells  Not Reportable   09/22/22  16:10    


 


Crenated Cell  Not Reportable   09/22/22  16:10    


 


Elliptocytes  Not Reportable   09/22/22  16:10    


 


Acanthocytes (Spur)  Not Reportable   09/22/22  16:10    


 


Rouleaux  Not Reportable   09/22/22  16:10    


 


Hemoglobin C Crystals  Not Reportable   09/22/22  16:10    


 


Schistocytes  Not Reportable   09/22/22  16:10    


 


Malaria parasites  Not Reportable   09/22/22  16:10    


 


Anish Bodies  Not Reportable   09/22/22  16:10    


 


Hem Pathologist Commnt  No   09/22/22  16:10    


 


Sodium  141 mmol/L (137-145)   09/23/22  10:46    


 


Potassium  4.2 mmol/L (3.6-5.0)   09/23/22  10:46    


 


Chloride  105.4 mmol/L ()   09/23/22  10:46    


 


Carbon Dioxide  24 mmol/L (22-30)   09/23/22  10:46    


 


Anion Gap  16 mmol/L  09/23/22  10:46    


 


BUN  13 mg/dL (9-20)   09/23/22  10:46    


 


Creatinine  0.7 mg/dL (0.8-1.3)  L  09/23/22  10:46    


 


Estimated GFR  > 60 ml/min  09/23/22  10:46    


 


BUN/Creatinine Ratio  19 %  09/23/22  10:46    


 


Glucose  125 mg/dL ()  H  09/23/22  10:46    


 


Calcium  8.9 mg/dL (8.4-10.2)   09/23/22  10:46    


 


Total Bilirubin  0.40 mg/dL (0.1-1.2)   09/22/22  16:10    


 


AST  14 units/L (5-40)   09/22/22  16:10    


 


ALT  11 units/L (7-56)   09/22/22  16:10    


 


Alkaline Phosphatase  98 units/L ()   09/22/22  16:10    


 


Total Protein  5.6 g/dL (6.3-8.2)  L  09/22/22  16:10    


 


Albumin  3.8 g/dL (3.9-5)  L  09/22/22  16:10    


 


Albumin/Globulin Ratio  2.1 %  09/22/22  16:10    











Echols/IV: 


                                        





Voiding Method                   Urinal











Active Medications





- Current Medications


Current Medications: 














Generic Name Dose Route Start Last Admin





  Trade Name Freq  PRN Reason Stop Dose Admin


 


Acetaminophen  650 mg  09/22/22 14:10 





  Acetaminophen 325 Mg Tab  PO  





  Q4H PRN  





  Pain MILD(1-3)/Fever >100.5/HA  


 


Famotidine  20 mg  09/22/22 15:00  09/25/22 11:43





  Famotidine 20 Mg/2 Ml Inj  IV   20 mg





  BID RYLEY   Administration


 


Heparin Sodium (Porcine)  5,000 unit  09/22/22 14:15  09/25/22 11:43





  Heparin 5,000 Unit/1 Ml Vial  SUB-Q   5,000 unit





  Q12HR RYLEY   Administration


 


Hydromorphone HCl  1 mg  09/23/22 13:00  09/25/22 11:34





  Hydromorphone 1 Mg/1 Ml Inj  IV   1 mg





  Q4H PRN   Administration





  Pain , Severe (7-10)  


 


Dextrose  1,000 mls @ 75 mls/hr  09/23/22 08:00  09/25/22 07:57





  D5w  IV   75 mls/hr





  AS DIRECT RYLEY   Administration


 


Ketorolac Tromethamine  30 mg  09/25/22 12:00 





  Ketorolac 30 Mg/1 Ml Inj  IV  09/25/22 12:01 





  ONCE ONE  


 


Ketorolac Tromethamine  15 mg  09/25/22 18:00 





  Ketorolac 30 Mg/1 Ml Inj  IV  09/30/22 17:59 





  Q6HR RYLEY  


 


Morphine Sulfate  2 mg  09/22/22 14:10  09/22/22 19:03





  Morphine 2 Mg/1 Ml Inj  IV   2 mg





  Q4H PRN   Administration





  Pain, Moderate (4-6)  


 


Ondansetron HCl  4 mg  09/22/22 14:10  09/22/22 22:55





  Ondansetron 4 Mg/2 Ml Inj  IV   4 mg





  Q8H PRN   Administration





  Nausea And Vomiting  


 


Sodium Chloride  10 ml  09/22/22 22:00  09/25/22 11:44





  Sodium Chloride 0.9% 10 Ml Flush Syringe  IV   10 ml





  BID RYLEY   Administration


 


Sodium Chloride  10 ml  09/22/22 14:10 





  Sodium Chloride 0.9% 10 Ml Flush Syringe  IV  





  PRN PRN  





  LINE FLUSH  


 


Zolpidem Tartrate  5 mg  09/22/22 22:36  09/24/22 22:53





  Zolpidem 5 Mg Tab  PO   5 mg





  QHS PRN   Administration





  Sleep

## 2022-09-26 VITALS — SYSTOLIC BLOOD PRESSURE: 123 MMHG | DIASTOLIC BLOOD PRESSURE: 62 MMHG

## 2022-09-26 RX ADMIN — HYDROMORPHONE HYDROCHLORIDE PRN MG: 1 INJECTION, SOLUTION INTRAMUSCULAR; INTRAVENOUS; SUBCUTANEOUS at 03:34

## 2022-09-26 RX ADMIN — Medication SCH ML: at 09:00

## 2022-09-26 RX ADMIN — FAMOTIDINE SCH MG: 10 INJECTION, SOLUTION INTRAVENOUS at 09:00

## 2022-09-26 RX ADMIN — HEPARIN SODIUM SCH UNIT: 5000 INJECTION, SOLUTION INTRAVENOUS; SUBCUTANEOUS at 09:00

## 2022-09-26 RX ADMIN — KETOROLAC TROMETHAMINE SCH MG: 30 INJECTION, SOLUTION INTRAMUSCULAR at 05:46

## 2022-09-26 RX ADMIN — HYDROMORPHONE HYDROCHLORIDE PRN MG: 1 INJECTION, SOLUTION INTRAMUSCULAR; INTRAVENOUS; SUBCUTANEOUS at 09:00

## 2022-09-26 RX ADMIN — KETOROLAC TROMETHAMINE SCH MG: 30 INJECTION, SOLUTION INTRAMUSCULAR at 00:18

## 2022-09-26 NOTE — DISCHARGE SUMMARY
Providers





- Providers


Date of Admission: 


09/22/22 14:10





Date of discharge: 09/26/22


Attending physician: 


KARRI REARDON MD





                                        





09/22/22 14:10


Consult to Physician [CONS] Routine 


   Comment: 


   Consulting Provider: FROY KOLB


   Physician Instructions: 


   Reason For Exam: Acute abdominal pain











Primary care physician: 


VIDYA MARTE








Hospitalization


Reason for admission: Colovescular fistula with diverticulosis s/p repair


Condition: Good


Pertinent studies: 





Reviewed. 


Procedures: 





Correction of colovesicular fistula


Hospital course: 





Patient is a 61-year-old male with past medical history of obesity, 

diverticulosis, hypertension, GERD, hyperlipidemia who presented to the to the 

hospital via direct admission after undergoing repair of colovesicular fistula 

with diverticulosis by general surgery on 9/22/2022. The patient tolerated the 

procedure well. The patient endorsed having abdominal cramping and nausea. The 

patient has since had an improvement in his diet. He will follow up with his 

gastroenterologist in the outpatient setting. The patient is medically cleared 

for discharge. 


Disposition: 01 HOME / SELF CARE / HOMELESS


Final Discharge Diagnosis (Prints w/discharge instructions): colovesicular 

fistula with diverticulosis, hypertension, GERD, hyperlipidemia, obesity


Time spent for discharge: 45 min 





Core Measure Documentation





- Palliative Care


Palliative Care/ Comfort Measures: Not Applicable





- Core Measures


Any of the following diagnoses?: history only





Exam





- Constitutional


Vitals: 


                                        











Temp Pulse Resp BP Pulse Ox


 


 98.0 F   59 L  17   123/62   98 


 


 09/26/22 05:05  09/26/22 05:05  09/26/22 06:16  09/26/22 05:05  09/26/22 08:39











General appearance: Present: no acute distress, well-nourished, obese





- EENT


Eyes: Present: PERRL, EOM intact


ENT: hearing intact, clear oral mucosa, dentition normal





- Neck


Neck: Present: supple, normal ROM





- Respiratory


Respiratory effort: normal


Respiratory: bilateral: CTA





- Cardiovascular


Rhythm: regular


Heart Sounds: Present: S1 & S2





- Extremities


Extremities: no ischemia, pulses intact, pulses symmetrical, No edema, normal 

temperature, normal color, Full ROM


Peripheral Pulses: within normal limits





- Abdominal


General gastrointestinal: Present: soft, tender, non-distended, normal bowel 

sounds


Localized gastrointestinal: tender: diffuse


Male genitourinary: Present: deferred





- Rectal


Rectal Exam: deferred





- Integumentary


Integumentary: Present: clear, warm, dry





- Musculoskeletal


Musculoskeletal: strength equal bilaterally





- Psychiatric


Psychiatric: appropriate mood/affect, intact judgment & insight, memory intact, 

cooperative





- Neurologic


Neurologic: CNII-XII intact, moves all extremities





- Allied Health


Allied health notes reviewed: nursing





Plan


Activity: advance as tolerated


Diet: low salt


Additional Instructions: Patient is a 61-year-old male with past medical history

 of obesity, diverticulosis, hypertension, GERD, hyperlipidemia who presented to

 the to the hospital via direct admission after undergoing repair of 

colovesicular fistula with diverticulosis by general surgery on 9/22/2022. The 

patient tolerated the procedure well. The patient endorsed having abdominal 

cramping and nausea. The patient has since had an improvement in his diet. He 

will follow up with his gastroenterologist in the outpatient setting. The 

patient is medically cleared for discharge.


Care Plan Goals: 


Patient is medically cleared for discharge. 


Assessment: 


Patient is a 61-year-old male with past medical history of obesity, 

diverticulosis, hypertension, GERD, hyperlipidemia who presented to the to the 

hospital via direct admission after undergoing repair of colovesicular fistula 

with diverticulosis by general surgery on 9/22/2022. The patient tolerated the 

procedure well. The patient endorsed having abdominal cramping and nausea. The 

patient has since had an improvement in his diet. He will follow up with his 

gastroenterologist in the outpatient setting. The patient is medically cleared 

for discharge. 


Follow up with: 


VIDYA MARTE MD [Primary Care Provider] - 7 Days


FROY KOLB MD [Staff Physician] - 7 Days


Forms:  Work/School Release Form